# Patient Record
Sex: FEMALE | Race: WHITE | NOT HISPANIC OR LATINO | URBAN - METROPOLITAN AREA
[De-identification: names, ages, dates, MRNs, and addresses within clinical notes are randomized per-mention and may not be internally consistent; named-entity substitution may affect disease eponyms.]

---

## 2017-02-26 ENCOUNTER — MYC MEDICAL ADVICE (OUTPATIENT)
Dept: FAMILY MEDICINE | Facility: CLINIC | Age: 22
End: 2017-02-26

## 2017-02-26 DIAGNOSIS — E28.2 PCOS (POLYCYSTIC OVARIAN SYNDROME): ICD-10-CM

## 2017-02-27 RX ORDER — NORGESTIMATE AND ETHINYL ESTRADIOL 0.25-0.035
1 KIT ORAL DAILY
Qty: 84 TABLET | Refills: 4 | Status: SHIPPED | OUTPATIENT
Start: 2017-02-27 | End: 2018-03-12

## 2017-06-10 ENCOUNTER — HEALTH MAINTENANCE LETTER (OUTPATIENT)
Age: 22
End: 2017-06-10

## 2018-03-12 ENCOUNTER — OFFICE VISIT (OUTPATIENT)
Dept: FAMILY MEDICINE | Facility: CLINIC | Age: 23
End: 2018-03-12
Payer: COMMERCIAL

## 2018-03-12 VITALS
WEIGHT: 203 LBS | DIASTOLIC BLOOD PRESSURE: 82 MMHG | HEIGHT: 63 IN | OXYGEN SATURATION: 97 % | HEART RATE: 64 BPM | TEMPERATURE: 97.8 F | SYSTOLIC BLOOD PRESSURE: 122 MMHG | RESPIRATION RATE: 18 BRPM | BODY MASS INDEX: 35.97 KG/M2

## 2018-03-12 DIAGNOSIS — E28.2 PCOS (POLYCYSTIC OVARIAN SYNDROME): ICD-10-CM

## 2018-03-12 DIAGNOSIS — L73.9 FOLLICULITIS: ICD-10-CM

## 2018-03-12 DIAGNOSIS — B35.3 TINEA PEDIS OF BOTH FEET: Primary | ICD-10-CM

## 2018-03-12 DIAGNOSIS — R61 GENERALIZED HYPERHIDROSIS: ICD-10-CM

## 2018-03-12 RX ORDER — NORGESTIMATE AND ETHINYL ESTRADIOL 0.25-0.035
1 KIT ORAL DAILY
Qty: 84 TABLET | Refills: 4 | Status: SHIPPED | OUTPATIENT
Start: 2018-03-12 | End: 2019-05-13

## 2018-03-12 RX ORDER — PRENATAL VIT 91/IRON/FOLIC/DHA 28-975-200
COMBINATION PACKAGE (EA) ORAL 2 TIMES DAILY
Qty: 30 G | Refills: 1 | Status: SHIPPED | OUTPATIENT
Start: 2018-03-12 | End: 2019-10-09

## 2018-03-12 RX ORDER — MUPIROCIN CALCIUM 20 MG/G
CREAM TOPICAL 2 TIMES DAILY
Qty: 30 G | Refills: 0 | Status: SHIPPED | OUTPATIENT
Start: 2018-03-12 | End: 2019-10-09

## 2018-03-12 ASSESSMENT — ENCOUNTER SYMPTOMS
FATIGUE: 0
ACTIVITY CHANGE: 0
GASTROINTESTINAL NEGATIVE: 1
RESPIRATORY NEGATIVE: 1
DIAPHORESIS: 1
CARDIOVASCULAR NEGATIVE: 1
APPETITE CHANGE: 0
FEVER: 0
PSYCHIATRIC NEGATIVE: 1

## 2018-03-12 NOTE — PATIENT INSTRUCTIONS
Here is the plan from today's visit    1. PCOS (polycystic ovarian syndrome)  - norgestimate-ethinyl estradiol (SPRINTEC 28) 0.25-35 MG-MCG per tablet; Take 1 tablet by mouth daily  Dispense: 84 tablet; Refill: 4    2. Tinea pedis of both feet  - terbinafine (LAMISIL AT) 1 % cream; Apply topically 2 times daily Until resolved (generally 1-2 weeks)  Dispense: 30 g; Refill: 1    3. Generalized hyperhidrosis  Trial of lamisil first to treat the athlete's foot and then the drysol topically to feet to help with excessive sweating  Return if not improving  - aluminum chloride (DRYSOL) 20 % external solution; Apply topically At Bedtime To improve effect, cover area of application with plastic wrap,  hold in place with tight shirt, and wash area in morning. As sweating improves, decrease use to 1-2 times weekly.  Dispense: 60 mL; Refill: 1    4. Folliculitis  - mupirocin (BACTROBAN) 2 % cream; Apply topically 2 times daily  Dispense: 30 g; Refill: 0      Please call or return to clinic if your symptoms don't go away.    Follow up plan - as needed    Thank you for coming to [facility] today.  Lab Testing:  **If you had lab testing today and your results are reassuring or normal they will be mailed to you or sent through [employer] within 7 days.   **If the lab tests need quick action we will call you with the results.  The phone number we will call with results is # [phone] (home) . If this is not the best number please call our clinic and change the number.  Medication Refills:  If you need any refills please call your pharmacy and they will contact us.   If you need to  your refill at a new pharmacy, please contact the new pharmacy directly. The new pharmacy will help you get your medications transferred faster.   Scheduling:  If you have any concerns about today's visit or wish to schedule another appointment please call our office during normal business hours [phone] (8-5:00 M-F)  If a referral was  made to a [facility] and you don't get a call from central scheduling please call [phone].  If a Mammogram was ordered for you at [facility] call [phone] to schedule or change your appointment.  If you had an XRay/CT/Ultrasound/MRI ordered the number is [phone] to schedule or change your radiology appointment.   Medical Concerns:  If you have urgent medical concerns please call [phone] at any time of the day.  If you have a medical emergency please call [phone].

## 2018-03-12 NOTE — PROGRESS NOTES
"      HPI:       Tami Diaz is a 23 year old who presents for the following  Patient presents with:  feet: feet started out dried in November. now, feet is starting to develop odor         Concern: feet    Description of the problem :  patient noticed dryness of both feet in November, some scaling and thickening. The dryness healed and now feet is starting to develop odor and seem damp all the time. Patient has history of sweatiness and condition has worsened.  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  Patient is an established patient of this clinic.         Review of Systems:   Review of Systems   Constitutional: Positive for diaphoresis (see HPI - mainly on feet). Negative for activity change, appetite change, fatigue and fever.   Respiratory: Negative.    Cardiovascular: Negative.    Gastrointestinal: Negative.    Skin: Positive for rash (see HPI).   Psychiatric/Behavioral: Negative.              Physical Exam:   Patient Vitals for the past 24 hrs:   Height Weight   03/12/18 1510 5' 3\" (160 cm) 203 lb (92.1 kg)     Body mass index is 35.96 kg/(m^2).  Vitals were reviewed and were normal     Physical Exam   Constitutional: She is oriented to person, place, and time. She appears well-developed and well-nourished.   HENT:   Head: Normocephalic and atraumatic.   Eyes: Conjunctivae and EOM are normal.   Pulmonary/Chest: Effort normal.   Neurological: She is alert " and oriented to person, place, and time.   Skin: Rash noted.   1) Rash on upper L arm; follicular eruption of posterior upper arm; no spreading erythema; no pustules or vessicles  2) Feet with some slight scale along the bottoms/sides of both feet; normal skin in between the toes; notable dampness to skin   Psychiatric: She has a normal mood and affect. Her behavior is normal. Judgment and thought content normal.         Results:       Assessment and Plan     Patient Instructions   Here is the plan from today's visit    1. PCOS (polycystic ovarian syndrome)  - norgestimate-ethinyl estradiol (SPRINTEC 28) 0.25-35 MG-MCG per tablet; Take 1 tablet by mouth daily  Dispense: 84 tablet; Refill: 4    2. Tinea pedis of both feet  - terbinafine (LAMISIL AT) 1 % cream; Apply topically 2 times daily Until resolved (generally 1-2 weeks)  Dispense: 30 g; Refill: 1    3. Hyperhidrosis of feet  Trial of lamisil first to treat the athlete's foot and then the drysol topically to feet to help with excessive sweating  Return if not improving  Change of socks at least once in the middle of the day  Breathable materials (light wool)  Air dry at home, sleep without socks  - aluminum chloride (DRYSOL) 20 % external solution; Apply topically At Bedtime To improve effect, cover area of application with plastic wrap,  hold in place with tight shirt, and wash area in morning. As sweating improves, decrease use to 1-2 times weekly.  Dispense: 60 mL; Refill: 1    4. Folliculitis  - mupirocin (BACTROBAN) 2 % cream; Apply topically 2 times daily  Dispense: 30 g; Refill: 0      Please call or return to clinic if your symptoms don't go away.    Follow up plan - as needed      There are no discontinued medications.  Options for treatment and follow-up care were reviewed with the patient. Tami Diaz  engaged in the decision making process and verbalized understanding of the options discussed and agreed with the final plan.    Layne Ramos,  MD

## 2018-03-12 NOTE — MR AVS SNAPSHOT
After Visit Summary   3/12/2018    Tami Diaz    MRN: [id number]           Patient Information     Date Of Birth          1995        Visit Information        Provider Department      3/12/2018 3:00 PM Layne Ramos MD [facility]        Today's Diagnoses     Tinea pedis of both feet    -  1    PCOS (polycystic ovarian syndrome)        Generalized hyperhidrosis        Folliculitis          Care Instructions    Here is the plan from today's visit    1. PCOS (polycystic ovarian syndrome)  - norgestimate-ethinyl estradiol (SPRINTEC 28) 0.25-35 MG-MCG per tablet; Take 1 tablet by mouth daily  Dispense: 84 tablet; Refill: 4    2. Tinea pedis of both feet  - terbinafine (LAMISIL AT) 1 % cream; Apply topically 2 times daily Until resolved (generally 1-2 weeks)  Dispense: 30 g; Refill: 1    3. Generalized hyperhidrosis  Trial of lamisil first to treat the athlete's foot and then the drysol topically to feet to help with excessive sweating  Return if not improving  - aluminum chloride (DRYSOL) 20 % external solution; Apply topically At Bedtime To improve effect, cover area of application with plastic wrap,  hold in place with tight shirt, and wash area in morning. As sweating improves, decrease use to 1-2 times weekly.  Dispense: 60 mL; Refill: 1    4. Folliculitis  - mupirocin (BACTROBAN) 2 % cream; Apply topically 2 times daily  Dispense: 30 g; Refill: 0      Please call or return to clinic if your symptoms don't go away.    Follow up plan - as needed    Thank you for coming to [facility] today.  Lab Testing:  **If you had lab testing today and your results are reassuring or normal they will be mailed to you or sent through [employer] within 7 days.   **If the lab tests need quick action we will call you with the results.  The phone number we will call with results is # [phone] (home) . If this is not the best number please call our clinic and change the  number.  Medication Refills:  If you need any refills please call your pharmacy and they will contact us.   If you need to  your refill at a new pharmacy, please contact the new pharmacy directly. The new pharmacy will help you get your medications transferred faster.   Scheduling:  If you have any concerns about today's visit or wish to schedule another appointment please call our office during normal business hours [phone] (8-5:00 M-F)  If a referral was made to a [facility] and you don't get a call from central scheduling please call [phone].  If a Mammogram was ordered for you at [facility] call [phone] to schedule or change your appointment.  If you had an XRay/CT/Ultrasound/MRI ordered the number is [phone] to schedule or change your radiology appointment.   Medical Concerns:  If you have urgent medical concerns please call [phone] at any time of the day.  If you have a medical emergency please call [phone].            Follow-ups after your visit        Who to contact     Please call your clinic at [phone] to:    Ask questions about your health    Make or cancel appointments    Discuss your medicines    Learn about your test results    Speak to your doctor            Additional Information About Your Visit        [employer] Information     [employer] gives you secure access to your electronic health record. If you see a primary care provider, you can also send messages to your care team and make appointments. If you have questions, please call your primary care clinic.  If you do not have a primary care provider, please call [phone] and they will assist you.      [employer] is an electronic gateway that provides easy, online access to your medical records. With [employer], you can request a clinic appointment, read your test results, renew a prescription or communicate with your care team.     To access your existing account, please contact your  "[facility] or call [phone] for assistance.        Care EveryWhere ID     This is your Care EveryWhere ID. This could be used by other organizations to access your [facility] medical records  [id number]        Your Vitals Were     Pulse Temperature Respirations Height Pulse Oximetry BMI (Body Mass Index)    64 97.8  F (36.6  C) (Oral) 18 5' 3\" (160 cm) 97% 35.96 kg/m2       Blood Pressure from Last 3 Encounters:   03/12/18 122/82   07/20/16 129/56   06/22/16 121/84    Weight from Last 3 Encounters:   03/12/18 203 lb (92.1 kg)   06/22/16 213 lb (96.6 kg)   06/09/16 213 lb (96.6 kg)              Today, you had the following     No orders found for display         Today's Medication Changes          These changes are accurate as of 3/12/18  4:22 PM.  If you have any questions, ask your nurse or doctor.               Start taking these medicines.        Dose/Directions    aluminum chloride 20 % external solution   Commonly known as:  DRYSOL   Used for:  Generalized hyperhidrosis   Started by:  Layne Ramos MD        Apply topically At Bedtime To improve effect, cover area of application with plastic wrap,  hold in place with tight shirt, and wash area in morning. As sweating improves, decrease use to 1-2 times weekly.   Quantity:  60 mL   Refills:  1       mupirocin 2 % cream   Commonly known as:  BACTROBAN   Used for:  Folliculitis   Started by:  Layne Ramos MD        Apply topically 2 times daily   Quantity:  30 g   Refills:  0       terbinafine 1 % cream   Commonly known as:  lamISIL AT   Used for:  Tinea pedis of both feet   Started by:  Layne Ramos MD        Apply topically 2 times daily Until resolved (generally 1-2 weeks)   Quantity:  30 g   Refills:  1            Where to get your medicines      These medications were sent to [facility] - [place], [place] - [address] [facility] [address]     Phone:  [phone]     " norgestimate-ethinyl estradiol 0.25-35 MG-MCG per tablet         These medications were sent to [facility] [id number] [place] [address] - [facility] [address] [place] [facility] [address]     Phone:  [phone]     mupirocin 2 % cream    terbinafine 1 % cream         Some of these will need a paper prescription and others can be bought over the counter.  Ask your nurse if you have questions.     Bring a paper prescription for each of these medications     aluminum chloride 20 % external solution                Primary Care Provider Office Phone # Fax #    Shilpa Rey -[phone]       [address] [address]        Equal Access to Services     PHILIPPE DUMONT : Rohith Harper, wago morton, qaybta kaalmada russell, marítnez montemayor. So [facility] [phone].    ATENCIÓN: Si habla español, tiene a reilly disposición servicios gratuitos de asistencia lingüística. [facility] [phone].    We comply with applicable federal civil rights laws and [place] laws. We do not discriminate on the basis of race, color, national origin, age, disability, sex, sexual orientation, or gender identity.            Thank you!     Thank you for choosing [facility]  for your care. Our goal is always to provide you with excellent care. Hearing back from our patients is one way we can continue to improve our services. Please take a few minutes to complete the written survey that you may receive in the mail after your visit with us. Thank you!             Your Updated Medication List - Protect others around you: Learn how to safely use, store and throw away your medicines at www.disposemymeds.org.          This list is accurate as of 3/12/18  4:22 PM.  Always use your most recent med list.                   Brand Name Dispense Instructions for use Diagnosis    aluminum chloride 20 % external solution    DRYSOL    60 mL     Apply topically At Bedtime To improve effect, cover area of application with plastic wrap,  hold in place with tight shirt, and wash area in morning. As sweating improves, decrease use to 1-2 times weekly.    Generalized hyperhidrosis       mupirocin 2 % cream    BACTROBAN    30 g    Apply topically 2 times daily    Folliculitis       norgestimate-ethinyl estradiol 0.25-35 MG-MCG per tablet    SPRINTEC 28    84 tablet    Take 1 tablet by mouth daily    PCOS (polycystic ovarian syndrome)       tacrolimus 0.1 % ointment    PROTOPIC    30 g    After 2 weeks, may use this product twice daily as needed.    Dermatitis       terbinafine 1 % cream    lamISIL AT    30 g    Apply topically 2 times daily Until resolved (generally 1-2 weeks)    Tinea pedis of both feet

## 2019-01-10 ENCOUNTER — OFFICE VISIT (OUTPATIENT)
Dept: FAMILY MEDICINE | Facility: CLINIC | Age: 24
End: 2019-01-10
Payer: COMMERCIAL

## 2019-01-10 VITALS
DIASTOLIC BLOOD PRESSURE: 58 MMHG | SYSTOLIC BLOOD PRESSURE: 113 MMHG | OXYGEN SATURATION: 96 % | TEMPERATURE: 97.4 F | HEART RATE: 84 BPM | WEIGHT: 220 LBS | BODY MASS INDEX: 38.97 KG/M2

## 2019-01-10 DIAGNOSIS — Z84.1 FAMILY HISTORY OF PRIMARY IGA NEPHROPATHY: ICD-10-CM

## 2019-01-10 DIAGNOSIS — N93.8 DUB (DYSFUNCTIONAL UTERINE BLEEDING): ICD-10-CM

## 2019-01-10 DIAGNOSIS — E55.9 VITAMIN D DEFICIENCY: ICD-10-CM

## 2019-01-10 DIAGNOSIS — Z30.8 ENCOUNTER FOR OTHER CONTRACEPTIVE MANAGEMENT: ICD-10-CM

## 2019-01-10 DIAGNOSIS — Z00.00 ROUTINE GENERAL MEDICAL EXAMINATION AT A HEALTH CARE FACILITY: Primary | ICD-10-CM

## 2019-01-10 DIAGNOSIS — D68.00 VON WILLEBRAND'S DISEASE (H): ICD-10-CM

## 2019-01-10 LAB
BASOPHILS # BLD AUTO: 0 10E9/L (ref 0–0.2)
BASOPHILS NFR BLD AUTO: 0.2 %
BILIRUBIN UR: ABNORMAL
BLOOD UR: NEGATIVE
BUN SERPL-MCNC: 10.2 MG/DL (ref 7–19)
CALCIUM SERPL-MCNC: 9.7 MG/DL (ref 8.5–10.1)
CHLORIDE SERPLBLD-SCNC: 100.7 MMOL/L (ref 98–110)
CO2 SERPL-SCNC: 24.8 MMOL/L (ref 20–32)
CREAT SERPL-MCNC: 0.6 MG/DL (ref 0.5–1)
DEPRECATED CALCIDIOL+CALCIFEROL SERPL-MC: 17 UG/L (ref 20–75)
DIFFERENTIAL METHOD BLD: ABNORMAL
EOSINOPHIL # BLD AUTO: 0.1 10E9/L (ref 0–0.7)
EOSINOPHIL NFR BLD AUTO: 1.1 %
ERYTHROCYTE [DISTWIDTH] IN BLOOD BY AUTOMATED COUNT: 13.6 % (ref 10–15)
FERRITIN SERPL-MCNC: 57 NG/ML (ref 12–150)
GFR SERPL CREATININE-BSD FRML MDRD: >90 ML/MIN/1.7 M2
GLUCOSE SERPL-MCNC: 135.5 MG'DL (ref 70–99)
GLUCOSE URINE: NEGATIVE
HCT VFR BLD AUTO: 46 % (ref 35–47)
HGB BLD-MCNC: 14.7 G/DL (ref 11.7–15.7)
IMM GRANULOCYTES # BLD: 0 10E9/L (ref 0–0.4)
IMM GRANULOCYTES NFR BLD: 0.2 %
KETONES UR QL: ABNORMAL
LEUKOCYTE ESTERASE UR: NEGATIVE
LYMPHOCYTES # BLD AUTO: 2.5 10E9/L (ref 0.8–5.3)
LYMPHOCYTES NFR BLD AUTO: 30.4 %
MCH RBC QN AUTO: 27.2 PG (ref 26.5–33)
MCHC RBC AUTO-ENTMCNC: 32 G/DL (ref 31.5–36.5)
MCV RBC AUTO: 85 FL (ref 78–100)
MONOCYTES # BLD AUTO: 0.5 10E9/L (ref 0–1.3)
MONOCYTES NFR BLD AUTO: 6.1 %
NEUTROPHILS # BLD AUTO: 5 10E9/L (ref 1.6–8.3)
NEUTROPHILS NFR BLD AUTO: 62 %
NITRITE UR QL STRIP: NEGATIVE
NRBC # BLD AUTO: 0 10*3/UL
NRBC BLD AUTO-RTO: 0 /100
PH UR STRIP: 6 [PH] (ref 5–7)
PLATELET # BLD AUTO: 291 10E9/L (ref 150–450)
POTASSIUM SERPL-SCNC: 4 MMOL/DL (ref 3.3–4.5)
PROTEIN UR: NEGATIVE
RBC # BLD AUTO: 5.41 10E12/L (ref 3.8–5.2)
SODIUM SERPL-SCNC: 134.5 MMOL/L (ref 132.6–141.4)
SP GR UR STRIP: 1.02
UROBILINOGEN UR STRIP-ACNC: ABNORMAL
WBC # BLD AUTO: 8.1 10E9/L (ref 4–11)

## 2019-01-10 NOTE — PROGRESS NOTES
Female Physical Note          HPI   Concerns today: ferratin, hemoglobin r/t bleeding disorder, and vitamin D check    Preventative  Feeling great with high energy. No evidence of bleeding, but endorses epistaxis, which is normal for them. Has never had a pap. Received flu shot at [facility] yesterday. Regularly visits the dentist.    Will eat fruits, vegetables. Will tries to incoporate walking, taking the stairs at school.    Menstrual Periods  Reports periods have been more irregular. Has been on the pill for 7 years, started consistently spotting before starting placebo. Will do a new placebo week every month. Thinks it's normal breakthrough bleeding. Wonders if they need to adjust med dose. Is not using birth control to avoid pregnancy, solely to control bleeding. Thinks the ring is difficult, would not like to gain weight. Pt opted for Mirena. Endorses pain and cramping.    Last saw Dr. Hightower for hematology in 2016. Plan from heme: test for von willebrand disease and platelet dysfunction, which were normal but may not be normal off estrogen. So if ever off estrogen could consider retesting.     Mood  Mood has been great. Is happy. Has good friends at school. Employed at [employer]. Identifies as female uses she/her/hers pronouns.    Sexual Health  Reports her libido is high. Has not yet explored her body/orgasms, would like to but is nervous. Is interested in having a relationship, but hasn't found anyone yet. Identifies as a lesbian.    Social Hx  Graduating next month, Industrial Design. No tobacco, drug use. Has a poodle.    Patient Active Problem List   Diagnosis     PCOS (polycystic ovarian syndrome)     Von Willebrand's disease (H)     Pervasive developmental disorder     Generalized hypermobility of joints     Seasonal allergic rhinitis     Pes planus of both feet     Chronic hip pain, unspecified laterality     Cervical segment dysfunction     Cervicalgia     Nonallopathic lesion of thoracic region      Chronic bilateral thoracic back pain     Nonallopathic lesion of sacrococcygeal region     Chronic bilateral low back pain without sciatica     Muscle spasm       Past Medical History:   Diagnosis Date     Dyslexia      Dysphasia      Iron deficiency anemia     not responsive to oral iron, venofer infusinos     PCOS (polycystic ovarian syndrome)      Receptive expressive language disorder        Previous Medical Care      Family History   Problem Relation Age of Onset     Hypertension Mother         birth mom      Kidney Disease Mother         IgA nephropathy     Ovarian Cancer Maternal Aunt 61        widely metastatic at dx     No Known Problems Brother      No Known Problems Brother      Coronary Artery Disease No family hx of      Diabetes No family hx of      Hyperlipidemia No family hx of      Cerebrovascular Disease No family hx of             Review of Systems:     Review of Systems:  CONSTITUTIONAL: NEGATIVE for fever, chills, change in weight  INTEGUMENTARY/SKIN: NEGATIVE for worrisome rashes, moles or lesions  EYES: NEGATIVE for vision changes or irritation  ENT/MOUTH: NEGATIVE for ear, mouth and throat problems  RESP: NEGATIVE for significant cough or SOB  BREAST: NEGATIVE for masses, tenderness or discharge  CV: NEGATIVE for chest pain, palpitations or peripheral edema  GI: NEGATIVE for nausea, abdominal pain, heartburn, or change in bowel habits  : NEGATIVE for frequency, dysuria, or hematuria  MUSCULOSKELETAL: NEGATIVE for significant arthralgias or myalgia  NEURO: NEGATIVE for weakness, dizziness or paresthesias  ENDOCRINE: NEGATIVE for temperature intolerance, skin/hair changes  HEME/ALLERGY: NEGATIVE for bleeding problems  PSYCHIATRIC: NEGATIVE for changes in mood or affect  Sleep:   Do you snore most or the night (as reported by a family member)? No  Do you feel sleepy or extremely tired during most of the day? No    See HPI for additional sx.    This document serves as a record of the  services and decisions personally performed and made by Shilpa Rey MD. It was created on his/her behalf by Minoo Ribeiro, a trained medical scribe. The creation of this document is based the provider's statements to the medical scribe.  Rajat Ribeiro 9:37 AM, January 10, 2019       Social History     Social History     Socioeconomic History     Marital status: Single     Spouse name: Not on file     Number of children: Not on file     Years of education: Not on file     Highest education level: Not on file   Social Needs     Financial resource strain: Not on file     Food insecurity - worry: Not on file     Food insecurity - inability: Not on file     Transportation needs - medical: Not on file     Transportation needs - non-medical: Not on file   Occupational History     Not on file   Tobacco Use     Smoking status: Never Smoker     Smokeless tobacco: Never Used   Substance and Sexual Activity     Alcohol use: No     Drug use: No     Sexual activity: No     Birth control/protection: Pill     Comment: OCP for PCOS   Other Topics Concern      Service Not Asked     Blood Transfusions Not Asked     Caffeine Concern Not Asked     Occupational Exposure Not Asked     Hobby Hazards Not Asked     Sleep Concern Not Asked     Stress Concern Not Asked     Weight Concern Not Asked     Special Diet Not Asked     Back Care Not Asked     Exercise No     Bike Helmet Yes     Seat Belt Yes     Self-Exams Not Asked     Parent/sibling w/ CABG, MI or angioplasty before 65F 55M? 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 Works at [employer]     Swimmer    Biomom Halie Davidsonock    Other mom Iqra Diaz    Sperm donor is a friend-known family hx, Halie Garrett       Marital Status:Single  Who lives in your household? roommates off-campus in housing, and with two moms when home on break.    Has anyone hurt you physically, for example by  pushing, hitting, slapping or kicking you or forcing you to have sex? Denies  Do you feel threatened or controlled by a partner, ex-partner or anyone in your life? Denies    Sexual Health     Sexual concerns: No   STI History: Neg  Pregnancy History: No obstetric history on file.  LMP No LMP recorded.   Last Pap Smear Date: No results found for: PAP  Abnormal Pap History: None and See problem list    Recommended Screening     Pap every 3 years for women 21-29. Recommended and patient accepted testing.         Physical Exam:   Vitals: /58   Pulse 84   Temp 97.4  F (36.3  C)   Wt 99.8 kg (220 lb)   SpO2 96%   BMI 38.97 kg/m    BMI= Body mass index is 38.97 kg/m .   Vitals were reviewed and were normal.    Physical Exam:    GENERAL: healthy, alert and no distress  EYES: Eyes grossly normal to inspection, extraocular movements - intact, and PERRL  HENT: ear canals- normal; TMs- normal; Nose- normal; Mouth- no ulcers, no lesions  NECK: no tenderness, no adenopathy, no asymmetry, no masses, no stiffness; thyroid- normal to palpation  RESP: lungs clear to auscultation - no rales, no rhonchi, no wheezes  CV: regular rates and rhythm, normal S1 S2, no S3 or S4 and no murmur, no click or rub -  ABDOMEN: soft, no tenderness, no  hepatosplenomegaly, no masses, normal bowel sounds  MS: extremities- no gross deformities noted, no edema  SKIN: no suspicious lesions, no rashes  NEURO: strength and tone- normal, sensory exam- grossly normal, mentation- intact, speech- normal, reflexes- symmetric  BACK: no CVA tenderness, no paralumbar tenderness  PSYCH: Alert and oriented times 3; speech- coherent , normal rate and volume; able to articulate logical thoughts, able to abstract reason, affect- bright, euthymic.  LYMPHATICS: ant. cervical- normal, post. cervical- normal, axillary- normal, supraclavicular- normal, inguinal- normal  Assessment and Plan   Tami was seen today for physical.    Diagnoses and all orders for this  visit:    Routine general medical examination at a health care facility  -     Pap imaged thin layer screen only - recommended age 21 - 24 years  -     Vitamin D Level  - Education on possible alternate methods of contraception. Pt chose Mirena, will schedule separate visit for insertion.  - Pap and Mirena insertion will be same visit.  Von Willebrand's disease (H) vs plt dysfunction   -     Ferritin  -     CBC with platelets differential  Needs retesting off estrogen (see ntoe dr. gardner 2015)    DUB (dysfunctional uterine bleeding)  - Future Mirena insertion.  - Labs in 3-6 months at [facility].  - Possible heme referral, future.    BMI 39.0-39.9,adult adivsed re: avoiding getting bmi >40. Wt loss indicated however pt wants to focus on last semester of school. rec follow up   Weight  1. Goal: lower your current weight, OR maintain this current weight. Going higher, is going to increase your risk.  2. Be more active.  3. Being fit is more important than being fat.    Encounter for other contraceptive management  -     Colposcopy/Gynecology Clinic-[facility] INTERNAL REFERRAL    Family history of primary IgA nephropathy  -     Basic Metabolic Panel ([facility])  -     Urinalysis (UA) ([facility])    AVS  Preventative  1. Good job on getting your flu shot!  2. Labs today at [facility]. Results via [facility].    Sexual Health  1. Use lubrication for penetrative activities. Coconut oil is safe to use as a lubricant.  2. Explore yourself, and what you enjoy.    Hematology  1. Continue birth control pills until you have the Mirena inserted.   2. After 3to 6 months with IUD insertion, and no birth control pills we will re do labs. You may possibly follow up with hematology.  3. Take 600mg of ibuprofen OR 1000mg Tylenol before you come in for the Mirena insertion, to help with cramping. The best medication for uterine cramping is Ibuprofen.      Options for treatment and follow-up care were reviewed with the patient . Tami Diaz  and/or guardian engaged in the decision making process and verbalized understanding of the options discussed and agreed with the final plan.    The information in this document, created by the medical scribe for me, accurately reflects the services I personally performed and the decisions made by me. I have reviewed and approved this document for accuracy prior to leaving the patient care area.    Shilpa Rye MD  9:37 AM, 01/10/19

## 2019-01-10 NOTE — PATIENT INSTRUCTIONS
Preventative  1. Good job on getting your flu shot!  2. Labs today at \[facility]\"". Results via [facility].    Weight  1. Goal: lower your current weight, OR maintain this current weight. Going higher, is going to increase your risk.  2. Be more active.  3. Being fit is more important than being fat.    Sexual Health  1. Use lubrication for penetrative activities. Coconut oil is safe to use as a lubricant.  2. Explore yourself, and what you enjoy.    Hematology  1. Continue birth control pills until you have the Mirena inserted.   2. After 3to 6 months with IUD insertion, and no birth control pills we will re do labs. You will then follow up with hematology.  3. Take 600mg of ibuprofen OR 1000mg Tylenol before you come in for the Mirena insertion, to help with cramping. The best medication for uterine cramping is Ibuprofen.      Preventive Health Recommendations  Female Ages 21 to 25     Yearly exam:     See your health care provider every year in order to  o Review health changes.   o Discuss preventive care.    o Review your medicines if your doctor has prescribed any.      You should be tested each year for STDs (sexually transmitted diseases).       Talk to your provider about how often you should have cholesterol testing.      Get a Pap test every three years. If you have an abnormal result, your doctor may have you test more often.      If you are at risk for diabetes, you should have a diabetes test (fasting glucose).     Shots:     Get a flu shot each year.     Get a tetanus shot every 10 years.     Consider getting the shot (vaccine) that prevents cervical cancer (Gardasil).    Nutrition:     Eat at least 5 servings of fruits and vegetables each day.    Eat whole-grain bread, whole-wheat pasta and brown rice instead of white grains and rice.    Get adequate Calcium and Vitamin D.     Lifestyle    Exercise at least 150 minutes a week each week (30 minutes a day, 5 days a week). This will help you control your  weight and prevent disease.    Limit alcohol to one drink per day.    No smoking.     Wear sunscreen to prevent skin cancer.    See your dentist every six months for an exam and cleaning.

## 2019-01-11 RX ORDER — ERGOCALCIFEROL 1.25 MG/1
50000 CAPSULE, LIQUID FILLED ORAL WEEKLY
Qty: 8 CAPSULE | Refills: 0 | Status: SHIPPED | OUTPATIENT
Start: 2019-01-11 | End: 2019-03-02

## 2019-01-30 ENCOUNTER — TELEPHONE (OUTPATIENT)
Dept: FAMILY MEDICINE | Facility: CLINIC | Age: 24
End: 2019-01-30

## 2019-01-30 NOTE — TELEPHONE ENCOUNTER
Called patient to schedule IUD placement and pap. Patient advised goes to school in [place]; not available to come in for appointment until spring break in March. Offered to schedule appointment for March. Patient declined; will call back to schedule.    What procedure: mirena IUD and simultaneous pap smear. Pt is a nullip.   Urgency of Appointment: Next Available  Would this patient benefit from pre-medication with Ativan for procedural anxiety? No  Is this patient on testosterone or post-menopausal (vaginal estrogen recommended)? No

## 2019-05-13 DIAGNOSIS — E28.2 PCOS (POLYCYSTIC OVARIAN SYNDROME): ICD-10-CM

## 2019-05-13 RX ORDER — NORGESTIMATE AND ETHINYL ESTRADIOL 0.25-0.035
1 KIT ORAL DAILY
Qty: 84 TABLET | Refills: 4 | Status: SHIPPED | OUTPATIENT
Start: 2019-05-13 | End: 2019-10-09

## 2019-09-30 ENCOUNTER — HEALTH MAINTENANCE LETTER (OUTPATIENT)
Age: 24
End: 2019-09-30

## 2019-10-09 ENCOUNTER — OFFICE VISIT (OUTPATIENT)
Dept: OBGYN | Facility: CLINIC | Age: 24
End: 2019-10-09
Attending: OBSTETRICS & GYNECOLOGY
Payer: COMMERCIAL

## 2019-10-09 ENCOUNTER — ANCILLARY PROCEDURE (OUTPATIENT)
Dept: ULTRASOUND IMAGING | Facility: CLINIC | Age: 24
End: 2019-10-09
Attending: OBSTETRICS & GYNECOLOGY
Payer: COMMERCIAL

## 2019-10-09 VITALS
WEIGHT: 210.3 LBS | HEIGHT: 63 IN | DIASTOLIC BLOOD PRESSURE: 107 MMHG | HEART RATE: 78 BPM | SYSTOLIC BLOOD PRESSURE: 145 MMHG | BODY MASS INDEX: 37.26 KG/M2

## 2019-10-09 DIAGNOSIS — E28.2 PCOS (POLYCYSTIC OVARIAN SYNDROME): Primary | ICD-10-CM

## 2019-10-09 DIAGNOSIS — E28.2 PCOS (POLYCYSTIC OVARIAN SYNDROME): ICD-10-CM

## 2019-10-09 PROCEDURE — 76830 TRANSVAGINAL US NON-OB: CPT

## 2019-10-09 RX ORDER — ESTRADIOL 1 MG/1
TABLET ORAL
Qty: 90 TABLET | Refills: 3 | Status: SHIPPED | OUTPATIENT
Start: 2019-10-09 | End: 2020-06-13

## 2019-10-09 RX ORDER — DROSPIRENONE AND ETHINYL ESTRADIOL 0.03MG-3MG
KIT ORAL
Qty: 90 TABLET | Refills: 3 | Status: SHIPPED | OUTPATIENT
Start: 2019-10-09 | End: 2020-06-13

## 2019-10-09 ASSESSMENT — ANXIETY QUESTIONNAIRES
6. BECOMING EASILY ANNOYED OR IRRITABLE: NOT AT ALL
7. FEELING AFRAID AS IF SOMETHING AWFUL MIGHT HAPPEN: NOT AT ALL
GAD7 TOTAL SCORE: 0
5. BEING SO RESTLESS THAT IT IS HARD TO SIT STILL: NOT AT ALL
2. NOT BEING ABLE TO STOP OR CONTROL WORRYING: NOT AT ALL
1. FEELING NERVOUS, ANXIOUS, OR ON EDGE: NOT AT ALL
3. WORRYING TOO MUCH ABOUT DIFFERENT THINGS: NOT AT ALL

## 2019-10-09 ASSESSMENT — MIFFLIN-ST. JEOR: SCORE: 1678.04

## 2019-10-09 ASSESSMENT — PATIENT HEALTH QUESTIONNAIRE - PHQ9
5. POOR APPETITE OR OVEREATING: NOT AT ALL
SUM OF ALL RESPONSES TO PHQ QUESTIONS 1-9: 1

## 2019-10-09 NOTE — PROGRESS NOTES
23 yo P0 LMP irregular on OCP presents for persistent breakthrough bleeding in the setting of vonWillebrand's disease, longterm OCP use, and PCOS.     Discussed options, risks, and benefits of different methods for menstrual control.     Patient Active Problem List   Diagnosis     PCOS (polycystic ovarian syndrome)     Von Willebrand's disease (H)     Pervasive developmental disorder     Generalized hypermobility of joints     Seasonal allergic rhinitis     Pes planus of both feet     Chronic hip pain, unspecified laterality     Cervical segment dysfunction     Cervicalgia     Nonallopathic lesion of thoracic region     Chronic bilateral thoracic back pain     Nonallopathic lesion of sacrococcygeal region     Chronic bilateral low back pain without sciatica     Muscle spasm     Past Medical History:   Diagnosis Date     Dyslexia      Dysphasia      Iron deficiency anemia     not responsive to oral iron, venofer infusinos     PCOS (polycystic ovarian syndrome)      Receptive expressive language disorder      Past Surgical History:   Procedure Laterality Date     HC CONTROL NOSEBLEED ANTERIOR, SIMPLE      multiple, due to VWD     OB History    Para Term  AB Living   0 0 0 0 0 0   SAB TAB Ectopic Multiple Live Births   0 0 0 0 0     Social History     Socioeconomic History     Marital status: Single     Spouse name: None     Number of children: None     Years of education: None     Highest education level: None   Occupational History     Occupation: [employer]      Comment: just finished degree in industrial design   Social Needs     Financial resource strain: None     Food insecurity:     Worry: None     Inability: None     Transportation needs:     Medical: None     Non-medical: None   Tobacco Use     Smoking status: Never Smoker     Smokeless tobacco: Never Used   Substance and Sexual Activity     Alcohol use: No     Drug use: No     Sexual activity: Never     Partners: Female     Birth  "control/protection: Pill     Comment: OCP for PCOS   Lifestyle     Physical activity:     Days per week: None     Minutes per session: None     Stress: None   Relationships     Social connections:     Talks on phone: None     Gets together: None     Attends Christian service: None     Active member of club or organization: None     Attends meetings of clubs or organizations: None     Relationship status: None     Intimate partner violence:     Fear of current or ex partner: None     Emotionally abused: None     Physically abused: None     Forced sexual activity: None   Other Topics Concern      Service Not Asked     Blood Transfusions Not Asked     Caffeine Concern Not Asked     Occupational Exposure Not Asked     Hobby Hazards Not Asked     Sleep Concern Not Asked     Stress Concern Not Asked     Weight Concern Not Asked     Special Diet Not Asked     Back Care Not Asked     Exercise No     Bike Helmet Yes     Seat Belt Yes     Self-Exams Not Asked     Parent/sibling w/ CABG, MI or angioplasty before 65F 55M? Not Asked   Social History Narrative    10/9/19    Finished college, back at home with parents. Degree in industrial design. Looking for \"real job\"    Dating.     Shilpa Godoy MD                Lives at college, [employer] [facility] at [employer] [employer], major is industrial design. Works at [employer].     Mojostreetom Halie Warroad    Other mom Iqra Diaz    Sperm donor is a friend-known family hx, Halie carried Tami     BP (!) 145/107   Pulse 78   Ht 1.6 m (5' 3\")   Wt 95.4 kg (210 lb 4.8 oz)   LMP 10/04/2019   Breastfeeding? No   BMI 37.25 kg/m    Appears well  Ultrasound shows thin endometrial stripe     Total visit time was 20 minutes with 20 minutes spent in counseling and coordination of care for breakthrough bleeding.    A/P:  Metrorrhagia on cyclic OCPs  Trial continuous OCP with addback E2 for breakthrough bleeding. Rxs sent.   Labs for metabolic disease associated with PCOS. "     Connect with Shilpa Rey re: possible metformin use    RTC prn or mychart with progress or concerns.     Shilpa Godoy MD

## 2019-10-09 NOTE — LETTER
10/9/2019       RE: Tami Diaz  [address]  [place] [address]     Dear Colleague,    Thank you for referring your patient, Tami Diaz, to the [facility]. Please see a copy of my visit note below.    23 yo P0 LMP irregular on OCP presents for persistent breakthrough bleeding in the setting of vonWillebrand's disease, longterm OCP use, and PCOS.     Discussed options, risks, and benefits of different methods for menstrual control.     Patient Active Problem List   Diagnosis     PCOS (polycystic ovarian syndrome)     Von Willebrand's disease (H)     Pervasive developmental disorder     Generalized hypermobility of joints     Seasonal allergic rhinitis     Pes planus of both feet     Chronic hip pain, unspecified laterality     Cervical segment dysfunction     Cervicalgia     Nonallopathic lesion of thoracic region     Chronic bilateral thoracic back pain     Nonallopathic lesion of sacrococcygeal region     Chronic bilateral low back pain without sciatica     Muscle spasm     Past Medical History:   Diagnosis Date     Dyslexia      Dysphasia      Iron deficiency anemia     not responsive to oral iron, venofer infusinos     PCOS (polycystic ovarian syndrome)      Receptive expressive language disorder      Past Surgical History:   Procedure Laterality Date     HC CONTROL NOSEBLEED ANTERIOR, SIMPLE      multiple, due to VWD     OB History    Para Term  AB Living   0 0 0 0 0 0   SAB TAB Ectopic Multiple Live Births   0 0 0 0 0     Social History     Socioeconomic History     Marital status: Single     Spouse name: None     Number of children: None     Years of education: None     Highest education level: None   Occupational History     Occupation: [employer]      Comment: just finished degree in industrial design   Social Needs     Financial resource strain: None     Food insecurity:     Worry: None      "Inability: None     Transportation needs:     Medical: None     Non-medical: None   Tobacco Use     Smoking status: Never Smoker     Smokeless tobacco: Never Used   Substance and Sexual Activity     Alcohol use: No     Drug use: No     Sexual activity: Never     Partners: Female     Birth control/protection: Pill     Comment: OCP for PCOS   Lifestyle     Physical activity:     Days per week: None     Minutes per session: None     Stress: None   Relationships     Social connections:     Talks on phone: None     Gets together: None     Attends Hoahaoism service: None     Active member of club or organization: None     Attends meetings of clubs or organizations: None     Relationship status: None     Intimate partner violence:     Fear of current or ex partner: None     Emotionally abused: None     Physically abused: None     Forced sexual activity: None   Other Topics Concern      Service Not Asked     Blood Transfusions Not Asked     Caffeine Concern Not Asked     Occupational Exposure Not Asked     Hobby Hazards Not Asked     Sleep Concern Not Asked     Stress Concern Not Asked     Weight Concern Not Asked     Special Diet Not Asked     Back Care Not Asked     Exercise No     Bike Helmet Yes     Seat Belt Yes     Self-Exams Not Asked     Parent/sibling w/ CABG, MI or angioplasty before 65F 55M? Not Asked   Social History Narrative    10/9/19    Finished college, back at home with parents. Degree in industrial design. Looking for \"real job\"    Dating.     Shilpa Godoy MD                Lives at college, [employer] [facility] [employer], major is industrial design. Works at [employer].     Swimmer    Biomom Halie Yahaira    Other mom Iqra Diaz    Sperm donor is a friend-known family hx, Halie carried Tami     BP (!) 145/107   Pulse 78   Ht 1.6 m (5' 3\")   Wt 95.4 kg (210 lb 4.8 oz)   LMP 10/04/2019   Breastfeeding? No   BMI 37.25 kg/m     Appears well  Ultrasound shows thin endometrial stripe "     Total visit time was 20 minutes with 20 minutes spent in counseling and coordination of care for breakthrough bleeding.    A/P:  Metrorrhagia on cyclic OCPs  Trial continuous OCP with addback E2 for breakthrough bleeding. Rxs sent.   Labs for metabolic disease associated with PCOS.     Connect with Shilpa Rey re: possible metformin use    RTC prn or mychart with progress or concerns.     Shilpa Godoy MD

## 2019-10-10 ASSESSMENT — ANXIETY QUESTIONNAIRES: GAD7 TOTAL SCORE: 0

## 2019-10-21 DIAGNOSIS — E28.2 PCOS (POLYCYSTIC OVARIAN SYNDROME): ICD-10-CM

## 2019-10-21 LAB
CHOLEST SERPL-MCNC: 191 MG/DL
GLUCOSE SERPL-MCNC: 88 MG/DL (ref 70–99)
HBA1C MFR BLD: 5.9 % (ref 0–5.6)
HDLC SERPL-MCNC: 49 MG/DL
LDLC SERPL CALC-MCNC: 83 MG/DL
NONHDLC SERPL-MCNC: 142 MG/DL
TRIGL SERPL-MCNC: 294 MG/DL

## 2019-10-21 PROCEDURE — 36415 COLL VENOUS BLD VENIPUNCTURE: CPT | Performed by: OBSTETRICS & GYNECOLOGY

## 2019-10-21 PROCEDURE — 83036 HEMOGLOBIN GLYCOSYLATED A1C: CPT | Performed by: OBSTETRICS & GYNECOLOGY

## 2019-10-21 PROCEDURE — 82947 ASSAY GLUCOSE BLOOD QUANT: CPT | Performed by: OBSTETRICS & GYNECOLOGY

## 2019-10-21 PROCEDURE — 80061 LIPID PANEL: CPT | Performed by: OBSTETRICS & GYNECOLOGY

## 2019-11-08 DIAGNOSIS — E28.2 PCOS (POLYCYSTIC OVARIAN SYNDROME): Primary | ICD-10-CM

## 2019-11-08 RX ORDER — METFORMIN HCL 500 MG
TABLET, EXTENDED RELEASE 24 HR ORAL
Qty: 120 TABLET | Refills: 3 | Status: SHIPPED | OUTPATIENT
Start: 2019-11-08 | End: 2020-02-11

## 2020-02-11 ENCOUNTER — OFFICE VISIT (OUTPATIENT)
Dept: FAMILY MEDICINE | Facility: CLINIC | Age: 25
End: 2020-02-11
Payer: COMMERCIAL

## 2020-02-11 VITALS
WEIGHT: 202.2 LBS | TEMPERATURE: 97.5 F | DIASTOLIC BLOOD PRESSURE: 81 MMHG | RESPIRATION RATE: 16 BRPM | BODY MASS INDEX: 35.83 KG/M2 | OXYGEN SATURATION: 98 % | HEART RATE: 67 BPM | SYSTOLIC BLOOD PRESSURE: 135 MMHG | HEIGHT: 63 IN

## 2020-02-11 DIAGNOSIS — H92.01 RIGHT EAR PAIN: Primary | ICD-10-CM

## 2020-02-11 DIAGNOSIS — M99.00 SOMATIC DYSFUNCTION OF HEAD REGION: ICD-10-CM

## 2020-02-11 DIAGNOSIS — H61.23 BILATERAL IMPACTED CERUMEN: ICD-10-CM

## 2020-02-11 ASSESSMENT — ENCOUNTER SYMPTOMS
FEVER: 0
SORE THROAT: 0
COUGH: 0
CHILLS: 0

## 2020-02-11 ASSESSMENT — MIFFLIN-ST. JEOR: SCORE: 1636.3

## 2020-02-11 NOTE — PROGRESS NOTES
Preceptor Attestation:   Patient seen, evaluated and discussed with the resident. I have verified the content of the note, which accurately reflects my assessment of the patient and the plan of care.   Supervising Physician:  Eugenio Rodriguez MD

## 2020-03-15 ENCOUNTER — HEALTH MAINTENANCE LETTER (OUTPATIENT)
Age: 25
End: 2020-03-15

## 2020-03-30 ENCOUNTER — TELEPHONE (OUTPATIENT)
Dept: FAMILY MEDICINE | Facility: CLINIC | Age: 25
End: 2020-03-30

## 2020-03-30 NOTE — TELEPHONE ENCOUNTER
RN spoke with patient who denies facial tenderness, discolored mucus, headache, or body aches.    They state that they get pretty bad seasonal allergies or mild colds and this is what the current symptoms feel like, aside from the extreme loss of voice. They have been very well hydrated, with water and hot tea.    RN recommended over the counter allergy medication to see if that makes a difference. Also recommended to continue the hydration, throat lozenges, and a humidifier.    They wanted to know if it is okay to go to work, RN stated that based on these current symptoms, yes they should be able to work. However recommended monitoring temperature twice daily and if they were to develop a fever, cough, or shortness of breath, they would need to self isolate.    Routing to PCP as an FYI.  Kelley Henderson RN

## 2020-03-30 NOTE — TELEPHONE ENCOUNTER
Agree with [facility] referral to assess.   Loss of voice has not been associated with COVID so this is reassuring.

## 2020-03-30 NOTE — TELEPHONE ENCOUNTER
[facility] phone call message-patient reporting a symptom:     Symptom: Sore throat causing loss of voice & runny nose.    When did symptoms begin? One week ago    Characteristics: (location on body, intensity, what makes it better or worse, associated symptoms):      Additional Details: Patient stated symptoms began a week ago and the sore throat is not causing pain but is causing her to lose her voice. Patient declined having any fever, cough, or SOB, no travel but unsure of possible exposure d/t working at [employer]. Patient requested a call back from RN to discuss symptoms. Author informed patient about [facility] and patient stated she will visit site while she waits for a call back.       Same Day Visit Offered: Yes, declined    Additional comments:     OK to leave message on voice mail? Yes    Advised patient that RN would call back within 3 hours, unless emergent   Primary language: English      needed? No    Call taken on March 30, 2020 at 10:58 AM by Isabel Rowe

## 2020-06-13 ENCOUNTER — MYC REFILL (OUTPATIENT)
Dept: OBGYN | Facility: CLINIC | Age: 25
End: 2020-06-13

## 2020-06-13 DIAGNOSIS — E28.2 PCOS (POLYCYSTIC OVARIAN SYNDROME): ICD-10-CM

## 2020-06-17 RX ORDER — DROSPIRENONE AND ETHINYL ESTRADIOL 0.03MG-3MG
KIT ORAL
Qty: 112 TABLET | Refills: 1 | Status: SHIPPED | OUTPATIENT
Start: 2020-06-17 | End: 2020-07-21

## 2020-06-17 RX ORDER — ESTRADIOL 1 MG/1
TABLET ORAL
Qty: 90 TABLET | Refills: 1 | Status: SHIPPED | OUTPATIENT
Start: 2020-06-17 | End: 2021-01-05

## 2020-06-17 NOTE — TELEPHONE ENCOUNTER
Sent remaining refills of OCP and estrace tabs to [facility] in [place] as requested.

## 2020-07-21 DIAGNOSIS — E28.2 PCOS (POLYCYSTIC OVARIAN SYNDROME): ICD-10-CM

## 2020-07-21 RX ORDER — DROSPIRENONE AND ETHINYL ESTRADIOL 0.03MG-3MG
KIT ORAL
Qty: 112 TABLET | Refills: 3 | Status: SHIPPED | OUTPATIENT
Start: 2020-07-21 | End: 2020-12-21

## 2020-12-21 DIAGNOSIS — E28.2 PCOS (POLYCYSTIC OVARIAN SYNDROME): ICD-10-CM

## 2020-12-21 RX ORDER — DROSPIRENONE AND ETHINYL ESTRADIOL 0.03MG-3MG
KIT ORAL
Qty: 112 TABLET | Refills: 3 | Status: SHIPPED | OUTPATIENT
Start: 2020-12-21 | End: 2022-01-18

## 2021-01-05 DIAGNOSIS — E28.2 PCOS (POLYCYSTIC OVARIAN SYNDROME): ICD-10-CM

## 2021-01-05 RX ORDER — ESTRADIOL 1 MG/1
TABLET ORAL
Qty: 90 TABLET | Refills: 1 | Status: SHIPPED | OUTPATIENT
Start: 2021-01-05 | End: 2021-08-11

## 2021-01-05 NOTE — TELEPHONE ENCOUNTER
Received refill request for estradiol 1mg tablets for breakthrough bleeding. Ok to send per Walt. rx sent.

## 2021-01-15 ENCOUNTER — HEALTH MAINTENANCE LETTER (OUTPATIENT)
Age: 26
End: 2021-01-15

## 2021-01-20 ENCOUNTER — TELEPHONE (OUTPATIENT)
Dept: FAMILY MEDICINE | Facility: CLINIC | Age: 26
End: 2021-01-20

## 2021-01-20 NOTE — TELEPHONE ENCOUNTER
RN attempted to reach patient-LM to contact clinic. Please transfer to any available RN when she calls back.       RN spoke to Yaneli at [facility] in [employer] who said they do have refills on file for patient's drospirenone-ethinyl estradiol and are working on filling now for patient. Of note- the medication on file was ordered by Dr. Godoy at [facility] so patient should be contacting them with questions or concerns.   Mraie Chin, RN

## 2021-01-20 NOTE — TELEPHONE ENCOUNTER
RN spoke to patient and relayed message below about medication. Patient verbalized understanding and had no further questions at this time.   Marie Chin RN

## 2021-01-20 NOTE — TELEPHONE ENCOUNTER
[facility] phone call message- medication clarification/question:    Full Medication Name: drospirenone-ethinyl estradiol (AMARIS) 3-0.03 MG tablet    Dose: One po daily in continuous fashion. No placebo pills. No days off.    Question/Clarification needed: Patient states on MyChart she can see that she has refills left, but the pharmacy states she does not. She would like to resend the medication or request someone call the pharmacy.      Pharmacy confirmed as       [id number] - [place], [place] - [address] [place] [address]  Phone: [phone] Fax: [phone]  : Yes    Please leave ONLY preferred pharmacy    OK to leave a message on voice mail? Yes    Advised patient that RN would call back within 3 hours, unless emergent.    Primary language: English      needed? No    Call taken on January 20, 2021 at 1:37 PM by April Bivens    Route to [facility]

## 2021-04-16 ENCOUNTER — IMMUNIZATION (OUTPATIENT)
Dept: NURSING | Facility: CLINIC | Age: 26
End: 2021-04-16
Payer: COMMERCIAL

## 2021-04-16 PROCEDURE — 0001A PR COVID VAC PFIZER DIL RECON 30 MCG/0.3 ML IM: CPT

## 2021-04-16 PROCEDURE — 91300 PR COVID VAC PFIZER DIL RECON 30 MCG/0.3 ML IM: CPT

## 2021-05-07 ENCOUNTER — IMMUNIZATION (OUTPATIENT)
Dept: NURSING | Facility: CLINIC | Age: 26
End: 2021-05-07
Attending: INTERNAL MEDICINE
Payer: COMMERCIAL

## 2021-05-07 PROCEDURE — 0002A PR COVID VAC PFIZER DIL RECON 30 MCG/0.3 ML IM: CPT

## 2021-05-07 PROCEDURE — 91300 PR COVID VAC PFIZER DIL RECON 30 MCG/0.3 ML IM: CPT

## 2021-05-09 ENCOUNTER — HEALTH MAINTENANCE LETTER (OUTPATIENT)
Age: 26
End: 2021-05-09

## 2021-06-18 ENCOUNTER — OFFICE VISIT (OUTPATIENT)
Dept: FAMILY MEDICINE | Facility: CLINIC | Age: 26
End: 2021-06-18
Payer: COMMERCIAL

## 2021-06-18 VITALS
WEIGHT: 208 LBS | HEART RATE: 94 BPM | TEMPERATURE: 98.4 F | OXYGEN SATURATION: 97 % | BODY MASS INDEX: 36.85 KG/M2 | RESPIRATION RATE: 16 BRPM | SYSTOLIC BLOOD PRESSURE: 130 MMHG | DIASTOLIC BLOOD PRESSURE: 86 MMHG

## 2021-06-18 DIAGNOSIS — E66.812 CLASS 2 OBESITY DUE TO EXCESS CALORIES WITHOUT SERIOUS COMORBIDITY WITH BODY MASS INDEX (BMI) OF 36.0 TO 36.9 IN ADULT: ICD-10-CM

## 2021-06-18 DIAGNOSIS — F84.0 AUTISM SPECTRUM DISORDER: ICD-10-CM

## 2021-06-18 DIAGNOSIS — Z12.4 SCREENING FOR CERVICAL CANCER: ICD-10-CM

## 2021-06-18 DIAGNOSIS — E28.2 PCOS (POLYCYSTIC OVARIAN SYNDROME): ICD-10-CM

## 2021-06-18 DIAGNOSIS — D68.00 VON WILLEBRAND'S DISEASE (H): ICD-10-CM

## 2021-06-18 DIAGNOSIS — Z00.00 ROUTINE GENERAL MEDICAL EXAMINATION AT A HEALTH CARE FACILITY: Primary | ICD-10-CM

## 2021-06-18 DIAGNOSIS — E66.09 CLASS 2 OBESITY DUE TO EXCESS CALORIES WITHOUT SERIOUS COMORBIDITY WITH BODY MASS INDEX (BMI) OF 36.0 TO 36.9 IN ADULT: ICD-10-CM

## 2021-06-18 LAB
CHOLEST SERPL-MCNC: 185 MG/DL
HBA1C MFR BLD: 5.7 % (ref 0–5.6)
HCV AB SERPL QL IA: NONREACTIVE
HDLC SERPL-MCNC: 58 MG/DL
HIV 1+2 AB+HIV1 P24 AG SERPL QL IA: NONREACTIVE
LDLC SERPL CALC-MCNC: 67 MG/DL
NONHDLC SERPL-MCNC: 127 MG/DL
TRIGL SERPL-MCNC: 298 MG/DL

## 2021-06-18 PROCEDURE — 86803 HEPATITIS C AB TEST: CPT | Performed by: FAMILY MEDICINE

## 2021-06-18 PROCEDURE — 80061 LIPID PANEL: CPT | Performed by: FAMILY MEDICINE

## 2021-06-18 PROCEDURE — 87389 HIV-1 AG W/HIV-1&-2 AB AG IA: CPT | Performed by: FAMILY MEDICINE

## 2021-06-18 PROCEDURE — 83036 HEMOGLOBIN GLYCOSYLATED A1C: CPT | Performed by: FAMILY MEDICINE

## 2021-06-18 PROCEDURE — 36415 COLL VENOUS BLD VENIPUNCTURE: CPT | Performed by: FAMILY MEDICINE

## 2021-06-18 PROCEDURE — 99395 PREV VISIT EST AGE 18-39: CPT | Performed by: FAMILY MEDICINE

## 2021-06-18 PROCEDURE — 99214 OFFICE O/P EST MOD 30 MIN: CPT | Mod: 25 | Performed by: FAMILY MEDICINE

## 2021-06-18 RX ORDER — METFORMIN HCL 500 MG
TABLET, EXTENDED RELEASE 24 HR ORAL
Qty: 187 TABLET | Refills: 0 | Status: SHIPPED | OUTPATIENT
Start: 2021-06-18 | End: 2022-01-27

## 2021-06-18 SDOH — SOCIAL STABILITY: SOCIAL INSECURITY
WITHIN THE LAST YEAR, HAVE YOU BEEN KICKED, HIT, SLAPPED, OR OTHERWISE PHYSICALLY HURT BY YOUR PARTNER OR EX-PARTNER?: NO

## 2021-06-18 SDOH — HEALTH STABILITY: MENTAL HEALTH
STRESS IS WHEN SOMEONE FEELS TENSE, NERVOUS, ANXIOUS, OR CAN'T SLEEP AT NIGHT BECAUSE THEIR MIND IS TROUBLED. HOW STRESSED ARE YOU?: ONLY A LITTLE

## 2021-06-18 SDOH — SOCIAL STABILITY: SOCIAL INSECURITY: WITHIN THE LAST YEAR, HAVE YOU BEEN AFRAID OF YOUR PARTNER OR EX-PARTNER?: NO

## 2021-06-18 SDOH — SOCIAL STABILITY: SOCIAL INSECURITY
WITHIN THE LAST YEAR, HAVE TO BEEN RAPED OR FORCED TO HAVE ANY KIND OF SEXUAL ACTIVITY BY YOUR PARTNER OR EX-PARTNER?: NO

## 2021-06-18 SDOH — HEALTH STABILITY: PHYSICAL HEALTH: ON AVERAGE, HOW MANY MINUTES DO YOU ENGAGE IN EXERCISE AT THIS LEVEL?: 30 MIN

## 2021-06-18 SDOH — SOCIAL STABILITY: SOCIAL NETWORK: HOW OFTEN DO YOU ATTEND CHURCH OR RELIGIOUS SERVICES?: NOT ASKED

## 2021-06-18 SDOH — SOCIAL STABILITY: SOCIAL NETWORK: ARE YOU MARRIED, WIDOWED, DIVORCED, SEPARATED, NEVER MARRIED, OR LIVING WITH A PARTNER?: NOT ASKED

## 2021-06-18 SDOH — SOCIAL STABILITY: SOCIAL NETWORK
DO YOU BELONG TO ANY CLUBS OR ORGANIZATIONS SUCH AS CHURCH GROUPS UNIONS, FRATERNAL OR ATHLETIC GROUPS, OR SCHOOL GROUPS?: YES

## 2021-06-18 SDOH — SOCIAL STABILITY: SOCIAL NETWORK: IN A TYPICAL WEEK, HOW MANY TIMES DO YOU TALK ON THE PHONE WITH FAMILY, FRIENDS, OR NEIGHBORS?: NOT ASKED

## 2021-06-18 SDOH — SOCIAL STABILITY: SOCIAL NETWORK: HOW OFTEN DO YOU ATTENT MEETINGS OF THE CLUB OR ORGANIZATION YOU BELONG TO?: NOT ASKED

## 2021-06-18 SDOH — HEALTH STABILITY: PHYSICAL HEALTH: ON AVERAGE, HOW MANY DAYS PER WEEK DO YOU ENGAGE IN MODERATE TO STRENUOUS EXERCISE (LIKE A BRISK WALK)?: 5 DAYS

## 2021-06-18 SDOH — SOCIAL STABILITY: SOCIAL INSECURITY: WITHIN THE LAST YEAR, HAVE YOU BEEN HUMILIATED OR EMOTIONALLY ABUSED IN OTHER WAYS BY YOUR PARTNER OR EX-PARTNER?: NO

## 2021-06-18 SDOH — SOCIAL STABILITY: SOCIAL NETWORK: HOW OFTEN DO YOU GET TOGETHER WITH FRIENDS OR RELATIVES?: MORE THAN THREE TIMES A WEEK

## 2021-06-18 NOTE — PROGRESS NOTES
Physical Note          HPI       Social Hx  Patient is currently living at home with family and feels safe and well supported. Patient graduated in 2019. She is currently working in [employer]. Patient completed 9 months of therapy at [facility] which she believes helped her with anger management behavior. Patient also recognized she had anxiety which may have been a factor of her anger. Patient is currently involved in an LGBT group. Patient reports she's had her first sexual experience last year but denies having any sexual relationships currently. Patient reports improvement in her stress levels and believes she has improved at regulating and managing her stress. Sleep insomnia, but compensates her sleep schedule accordingly.    Physical activity and diet  Patient is happy to see she lost a few pounds. 220 lb was her maximum weight. Work has helped her stay more active. Walks a lot at work which is five days per week and on her days off she walks the dog. She plans on returning to dance soon. Always Eating is sporadic due to her work schedule. She states to not eat until noon and returns home from work and  eats until 12:30 AM but doesn't sleep till 2 AM. She eats at least one fruit and one vegetable daily and drinks a lot of water. Patient denies vaping or the use of alcohol.     Obgyn  Patient does not enjoy getting her menstruation cycle and is interested in stopping them. She went on birth control which helped for three months but her cycle returned with excess blood clots. She begins spotting for a week before her cycle. She tends to cramp a lot more after being on birth control and reports inconvenience with work and her lifestyle. She is interested in children in the future but would like to adopt and would not mind removing her ovaries.   Pimples on underside of breast and go away on their own that come every few months and believes this is from the birth control.      MSK  She only gets headaches on her periods.  Patient reports back pain. She is still wearing her orthotics. Standing still and moving too much causes pain. Normally doesn't take any pain killers and isn't that bad.       Concerns today: see above      Patient Active Problem List   Diagnosis     PCOS (polycystic ovarian syndrome)     Von Willebrand's disease (H)     Autism spectrum disorder     Generalized hypermobility of joints     Seasonal allergic rhinitis     Pes planus of both feet     Cervicalgia     Chronic bilateral low back pain without sciatica     Class 2 obesity due to excess calories without serious comorbidity with body mass index (BMI) of 36.0 to 36.9 in adult       Past Medical History:   Diagnosis Date     Dyslexia      Dysphasia      Iron deficiency anemia     not responsive to oral iron, venofer infusinos     Nonallopathic lesion of thoracic region 8/2/2016     PCOS (polycystic ovarian syndrome)      Receptive expressive language disorder         Family History   Problem Relation Age of Onset     Hypertension Mother         birth mom      Kidney Disease Mother         IgA nephropathy     Ovarian Cancer Maternal Aunt 61        widely metastatic at dx     No Known Problems Brother      No Known Problems Brother      Prostate Cancer Maternal Grandfather      Coronary Artery Disease No family hx of      Diabetes No family hx of      Hyperlipidemia No family hx of      Cerebrovascular Disease No family hx of      Breast Cancer No family hx of               Review of Systems:     Review of Systems:    Positive for: cramps and headaches while on menstruation cycle, insomnia, back pain    Negative for: heart, lung or bowel problems,     Sleep:   Do you snore most or the night (as reported by a family member)? No  Do you feel sleepy or extremely tired during most of the day? No             Social History     Social History     Socioeconomic History     Marital status: Single     Spouse name: Not on file     Number of children: Not on file     Years  of education: Not on file     Highest education level: Not on file   Occupational History     Occupation: [employer]      Comment: just finished degree in industrial design   Social Needs     Financial resource strain: Not on file     Food insecurity     Worry: Not on file     Inability: Not on file     Transportation needs     Medical: Not on file     Non-medical: Not on file   Tobacco Use     Smoking status: Never Smoker     Smokeless tobacco: Never Used   Substance and Sexual Activity     Alcohol use: No     Drug use: No     Sexual activity: Not Currently     Partners: Female     Birth control/protection: Pill     Comment: OCP for PCOS, using safer sex practices   Lifestyle     Physical activity     Days per week: 5 days     Minutes per session: 30 min     Stress: Only a little   Relationships     Social connections     Talks on phone: Not on file     Gets together: More than three times a week     Attends Yarsani service: Not on file     Active member of club or organization: Yes     Attends meetings of clubs or organizations: Not on file     Relationship status: Not on file     Intimate partner violence     Fear of current or ex partner: No     Emotionally abused: No     Physically abused: No     Forced sexual activity: No   Other Topics Concern      Service Not Asked     Blood Transfusions Not Asked     Caffeine Concern Not Asked     Occupational Exposure Not Asked     Hobby Hazards Not Asked     Sleep Concern Not Asked     Stress Concern Not Asked     Weight Concern Not Asked     Special Diet Not Asked     Back Care Not Asked     Exercise No     Bike Helmet Yes     Seat Belt Yes     Self-Exams Not Asked     Parent/sibling w/ CABG, MI or angioplasty before 65F 55M? Not Asked   Social History Narrative    Finished college in 2019, back at home with parents. Works at [employer].     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 parents    Has anyone hurt you physically, for example by pushing, hitting, slapping or kicking you or forcing you to have sex? Denies  Do you feel threatened or controlled by a partner, ex-partner or anyone in your life? Denies    Sexual Health     Sexual concerns: No   STI History: Neg  Pregnancy History:   LMP Patient's last menstrual period was 2021 (approximate).   Last Pap Smear Date: No results found for: PAP  Abnormal Pap History: None    Recommended Screening     Cholesterol Level (>46 yo or at risk):  Recommended and patient accepted testing., Pap every 3 years for women 21-29. Recommended and patient accepted testing. and HIV screening:  Recommended and patient accepted testing.             Physical Exam:     Vitals: /86   Pulse 94   Temp 98.4  F (36.9  C) (Oral)   Resp 16   Wt 94.3 kg (208 lb)   LMP 2021 (Approximate)   SpO2 97%   BMI 36.85 kg/m    BMI= Body mass index is 36.85 kg/m .   GENERAL: healthy, alert and no distress  EYES: Eyes grossly normal to inspection, extraocular movements - intact, and PERRL  HENT: ear canals- normal; TMs- normal; Nose- normal; Mouth- no ulcers, no lesions, Tongue is scalloped  NECK: no tenderness, no adenopathy, no asymmetry, no masses, no stiffness; thyroid- normal to palpation  RESP: lungs clear to auscultation - no rales, no rhonchi, no wheezes  CV: regular rates and rhythm, normal S1 S2, no S3 or S4 and no murmur, no click or rub -  ABDOMEN: soft, no tenderness, no  hepatosplenomegaly, no masses, normal bowel sounds  MS: extremities- no gross deformities noted, no edema  SKIN: no suspicious lesions, no rashes  NEURO: strength and tone- normal, sensory exam- grossly normal, mentation- intact, speech- normal, reflexes- symmetric  BACK: no CVA tenderness, no paralumbar tenderness  - female: cervix- normal,  adnexae- normal; uterus- normal, no masses, no discharge, normal external genitalia, unable to tolerate a speculum, single finger exam poorly tolerated with tight hymenal ring, unable to pass a cyto brush, blood present, q-tip test is negative.   PSYCH: Alert and oriented times 3; speech- coherent , normal rate and volume; able to articulate logical thoughts, able to abstract reason, no tangential thoughts, no hallucinations or delusions, affect- normal  LYMPHATICS: ant. cervical- normal, post. cervical- normal, axillary- normal, supraclavicular- normal, inguinal- normal      Assessment and Plan      Tami was seen today for physical.    Diagnoses and all orders for this visit:    Routine general medical examination at a health care facility  See MIMI    Autism spectrum disorder  Noted - enc follow up with Brian     PCOS (polycystic ovarian syndrome)  -     metFORMIN (GLUCOPHAGE-XR) 500 MG 24 hr tablet; Take 1 tablet (500 mg) by mouth daily (with dinner) for 7 days, THEN 2 tablets (1,000 mg) daily (with dinner).  1. I would recommend looking into metformin which is a medication that may be worth trying to help regulate your menstruation cycle.    -Side effects include nausea, which improves with time.   -We would start at a low dose, 500 mg, and slowly increase it.    - If this medication is ineffective, you can then look into surgical options.     Von Willebrand's disease (H)  Noted - contributes to PCOS pain with periods .    Class 2 obesity due to excess calories without serious comorbidity with body mass index (BMI) of 36.0 to 36.9 in adult  -     Lipid panel  -     Hemoglobin A1c    Diet  1. Try to combine up protein with the carbs.    - For example, try to add peanut butter with the crackers.    2. I would recommend having something for breakfast even if it's just a couple of bites.   Continue excellent exercising     Screening for cervical cancer  -     Cancel: Pap imaged thin layer screen reflex to HPV if  ASCUS - recommended age 25 - 29 years  Unable - low risk. Enc self exploration, digitally or with toys, not to cause pain but to soften the edges of hymenal ring and eventually hopefully we can do screening   Consider HPV only once operationalized at clinic             Options for treatment and follow-up care were reviewed with the patient . Tami Diaz and/or guardian engaged in the decision making process and verbalized understanding of the options discussed and agreed with the final plan.    Shilpa Rey MD

## 2021-06-18 NOTE — PROGRESS NOTES
Wt Readings from Last 10 Encounters:   06/18/21 94.3 kg (208 lb)   02/11/20 91.7 kg (202 lb 3.2 oz)   10/09/19 95.4 kg (210 lb 4.8 oz)   01/10/19 99.8 kg (220 lb)   03/12/18 92.1 kg (203 lb)   06/22/16 96.6 kg (213 lb)   06/09/16 96.6 kg (213 lb)   06/07/16 96.9 kg (213 lb 9.6 oz)   03/16/16 97.1 kg (214 lb)   03/17/15 99 kg (218 lb 3.2 oz) (99 %, Z= 2.18)*     * Growth percentiles are based on CDC (Girls, 2-20 Years) data.    Body mass index is 36.85 kg/m .

## 2021-06-18 NOTE — PATIENT INSTRUCTIONS
OBGYN  1. I would recommend looking into metformin which is a medication that may be worth trying to help regulate your menstruation cycle.    -Side effects include nausea, which improves with time.   -We would start at a low dose, 500 mg, and slowly increase it.    - If this medication is ineffective, you can then look into surgical options.     Diet  1. Try to combine up protein with the carbs.    - For example, try to add peanut butter with the crackers.    2. I would recommend having something for breakfast even if it's just a couple of bites.     Preventive Health Recommendations  Female Ages 21 to 25     Yearly exam:     See your health care provider every year in order to  o Review health changes.   o Discuss preventive care.    o Review your medicines if your doctor has prescribed any.      You should be tested each year for STDs (sexually transmitted diseases).       Talk to your provider about how often you should have cholesterol testing.      Get a Pap test every three years. If you have an abnormal result, your doctor may have you test more often.      If you are at risk for diabetes, you should have a diabetes test (fasting glucose).     Shots:     Get a flu shot each year.     Get a tetanus shot every 10 years.     Consider getting the shot (vaccine) that prevents cervical cancer (Gardasil).    Nutrition:     Eat at least 5 servings of fruits and vegetables each day.    Eat whole-grain bread, whole-wheat pasta and brown rice instead of white grains and rice.    Get adequate Calcium and Vitamin D.     Lifestyle    Exercise at least 150 minutes a week each week (30 minutes a day, 5 days a week). This will help you control your weight and prevent disease.    Limit alcohol to one drink per day.    No smoking.     Wear sunscreen to prevent skin cancer.    See your dentist every six months for an exam and cleaning.  Preventive Health Recommendations  Female Ages 21 to 25     Yearly exam:   See your health  care provider every year in order to  Review health changes.   Discuss preventive care.    Review your medicines if your doctor has prescribed any.    You should be tested each year for STDs (sexually transmitted diseases).     Talk to your provider about how often you should have cholesterol testing.    Get a Pap test every three years. If you have an abnormal result, your doctor may have you test more often.    If you are at risk for diabetes, you should have a diabetes test (fasting glucose).     Shots:   Get a flu shot each year.   Get a tetanus shot every 10 years.   Consider getting the shot (vaccine) that prevents cervical cancer (Gardasil).    Nutrition:   Eat at least 5 servings of fruits and vegetables each day.  Eat whole-grain bread, whole-wheat pasta and brown rice instead of white grains and rice.  Get adequate Calcium and Vitamin D.     Lifestyle  Exercise at least 150 minutes a week each week (30 minutes a day, 5 days a week). This will help you control your weight and prevent disease.  Limit alcohol to one drink per day.  No smoking.   Wear sunscreen to prevent skin cancer.  See your dentist every six months for an exam and cleaning.

## 2021-06-24 ENCOUNTER — OFFICE VISIT (OUTPATIENT)
Dept: HEMATOLOGY | Facility: CLINIC | Age: 26
End: 2021-06-24
Attending: PHYSICIAN ASSISTANT
Payer: COMMERCIAL

## 2021-06-24 VITALS
HEIGHT: 63 IN | HEART RATE: 76 BPM | WEIGHT: 209.9 LBS | TEMPERATURE: 97.9 F | DIASTOLIC BLOOD PRESSURE: 92 MMHG | OXYGEN SATURATION: 98 % | SYSTOLIC BLOOD PRESSURE: 134 MMHG | BODY MASS INDEX: 37.19 KG/M2 | RESPIRATION RATE: 14 BRPM

## 2021-06-24 DIAGNOSIS — D68.00 VON WILLEBRAND'S DISEASE (H): Primary | ICD-10-CM

## 2021-06-24 LAB
ERYTHROCYTE [DISTWIDTH] IN BLOOD BY AUTOMATED COUNT: 13.1 % (ref 10–15)
HCT VFR BLD AUTO: 44 % (ref 35–47)
HGB BLD-MCNC: 14.3 G/DL (ref 11.7–15.7)
MCH RBC QN AUTO: 26.5 PG (ref 26.5–33)
MCHC RBC AUTO-ENTMCNC: 32.5 G/DL (ref 31.5–36.5)
MCV RBC AUTO: 82 FL (ref 78–100)
PLATELET # BLD AUTO: 272 10E9/L (ref 150–450)
RBC # BLD AUTO: 5.4 10E12/L (ref 3.8–5.2)
WBC # BLD AUTO: 5.1 10E9/L (ref 4–11)

## 2021-06-24 PROCEDURE — 36415 COLL VENOUS BLD VENIPUNCTURE: CPT | Performed by: PHYSICIAN ASSISTANT

## 2021-06-24 PROCEDURE — 999N001023 HC STATISTIC INR NC: Performed by: PHYSICIAN ASSISTANT

## 2021-06-24 PROCEDURE — 999N001028 HC STATISTIC PTT NC: Performed by: PHYSICIAN ASSISTANT

## 2021-06-24 PROCEDURE — 85390 FIBRINOLYSINS SCREEN I&R: CPT | Performed by: PATHOLOGY

## 2021-06-24 PROCEDURE — 99207 PR CDG-CODE CATEGORY CHANGED: CPT | Performed by: PHYSICIAN ASSISTANT

## 2021-06-24 PROCEDURE — G0463 HOSPITAL OUTPT CLINIC VISIT: HCPCS

## 2021-06-24 PROCEDURE — 85027 COMPLETE CBC AUTOMATED: CPT | Performed by: PHYSICIAN ASSISTANT

## 2021-06-24 PROCEDURE — 85245 CLOT FACTOR VIII VW RISTOCTN: CPT | Performed by: PHYSICIAN ASSISTANT

## 2021-06-24 PROCEDURE — 85246 CLOT FACTOR VIII VW ANTIGEN: CPT | Performed by: PHYSICIAN ASSISTANT

## 2021-06-24 PROCEDURE — 85240 CLOT FACTOR VIII AHG 1 STAGE: CPT | Performed by: PHYSICIAN ASSISTANT

## 2021-06-24 PROCEDURE — 999N001035 HC STATISTIC THROMBIN TIME NC: Performed by: PHYSICIAN ASSISTANT

## 2021-06-24 PROCEDURE — 99214 OFFICE O/P EST MOD 30 MIN: CPT | Performed by: PHYSICIAN ASSISTANT

## 2021-06-24 RX ORDER — AMINOCAPROIC ACID 0.25 G/ML
3 SYRUP ORAL 3 TIMES DAILY
Qty: 236.5 ML | Refills: 1 | Status: SHIPPED | OUTPATIENT
Start: 2021-06-24 | End: 2022-01-27

## 2021-06-24 ASSESSMENT — PAIN SCALES - GENERAL: PAINLEVEL: NO PAIN (0)

## 2021-06-24 ASSESSMENT — MIFFLIN-ST. JEOR: SCORE: 1666.23

## 2021-06-24 NOTE — PROGRESS NOTES
[facility]  [facility]  [address], [address], [place] [address]  Main: [phone], Fax: [phone]        6/24/2021  Outpatient Clinic Visit    Tami is a 26-year-old woman here today to re-establish care in the [facility].  [facility] [place].      She was initially evaluated as a young child due to easy bruising and epistaxis.  She later developed significant menorrhagia.  She was felt to probably have type 1 von Willebrand disease.  She has 2 brothers, both of whom have more definitive diagnoses of von Willebrand disease.  Her bleeding issues have been heavy menstrual bleeding and some epistaxis.  She reports the nosebleeding as frequent but generally short lived.  She has not been doing any preventative treatment for nosebleeds recently.   She is diagnosed with PCOS and is on hormone therapy for this.  She says despite this she has irregular and somewhat heavy periods.      She has never had a surgical hemostatic challenge.  She is again here today simply to re-establish care and make a plan for upcoming wisdom teeth extraction. She needs to have two upper impacted wisdom teeth removed.  Planning to use Dr. Patel oral surgeon ([phone])      HISTORY OF BLEEDING DISORDER  We reviewed the results of her previous testing performed at [place] [facility] by Dr. Caron Retana and also some testing that was performed here at the [facility] subsequent to that.  She was initially tested in 08/2001.  At that time, she saw Dr. Duane Hasegawa in [place].  Lab values from that evaluation were remarkable for a normal factor VIII of 109% and a normal von Willebrand antigen of 73%.  However, her ristocetin cofactor activity was significantly reduced at 25%.  Her von Willebrand factor multimer analysis was said to be consistent with type 1 von  Willebrand disease.  Repeat testing in 11/2001 showed a factor VIII of 91%, a von Willebrand antigen of 180%, and this time her ristocetin cofactor activity was normal at 103%.  However, at that time, her platelet function testing with the PFA-100 instrument was clearly abnormal.  She was tested here at the [facility] in 10/2002.  At that time, she had a full bleeding workup.  Her CBC was normal.  Bleeding time was prolonged at 16 minutes and greater than 20 minutes on repeat testing.  Factor VIII level was 162%, von Willebrand antigen was 133%, and ristocetin cofactor activity was 108%.  Additional labs include a normal factor IX level, normal factor XIII, normal fibrinogen antigen and activity levels, normal SHEMAR-1 and normal alpha-2 antiplasmin levels.  At that time, her platelet function testing with the PFA-100 instrument was normal.  She also had formal platelet aggregation studies which were normal.  Platelet morphology with light microscopy was normal as well.  Testing was agin performed in 03/2012 prior to starting estrogen therapy.  At that time, her factor VIII level was 126%, von Willebrand antigen 76%, and ristocetin cofactor activity was again low at 50%.  Thus, we have 4 sets of numbers and all of them show normal factor VIII and von Willebrand antigen levels, but 2 of them show ristocetin cofactor activities that are slightly low to frankly low and 2 show normal ristocetin cofactor activity.  She has had abnormal platelet function testing with the PFA-100 instrument twice (11/2011 and 03/2012) but normal platelet aggregation testing in 2002.      Her von Willebrand numbers are clearly distinct from that of her 2 brothers, both of whom have factor VIII, von Willebrand antigen and ristocetin cofactor activity levels in the 35% to 60% range.      Her mother was also evaluated here back in 2003.  She was tested again in 2006.  On both occasions, her von Willebrand panel was clearly normal  with factor VIII, von Willebrand antigen and ristocetin cofactor activity levels in the 100% to 130% range.  She did have platelet function testing performed with the PFA-100 instrument and had a pattern similar to aspirin effect, although she was not taking non-steroidal anti-inflammatory drugs at that time.  Her mom's history consists primarily of menorrhagia but also some epistaxis and easy bruising, although overall it sounds like her bleeding has been less problematic than what Tami has experienced.  Her father has no known bleeding history.       Tami does have some features of hypermobility.  She is able to touch her tongue to the tip of her nose.  She is able to hyperextend her elbows slightly; however, she does not have the same degree of hypermobility of the small joints in her fingers and thumbs.  Her skin appears to be of normal texture and elasticity.  She does have small scars from her bleeding times which are perhaps a bit wider than normal, although not clearly abnormal.  She also has a small scar on her right leg where she received 9 stitches during childhood.  The scar is slightly wide but not clearly abnormal.      Labs:   Results of bleeding work-up done in 2015 were all normal, including von Willebrand panel, platelet aggregation studies, an platelet electron microscopy.  Thus, although her clinical history is suggestive of a defect in primary hemostasis, we cannot establish such a diagnosis on the basis of the current lab results.     Normal platelet aggregation testing 4/2015     Ref. Range 4/28/2015 08:20   Factor 8 Assay Latest Ref Range: 60 - 140 % 125   von Willebrand Antigen Latest Ref Range: 50 - 160 % 86   Ristocetin Cofactor Latest Ref Range: 50 - 180 % 92     ASSESSMENT AND PLAN:   1.  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 Platelet aggregation testing and von Willebrand testing done in 2015 was normal.   Von Willebrand testing will be repeated today - last estrogen was one week ago.   2.  Family history of von Willebrand disease in her 2 brothers.     Jeremy has very mild bleeding symptoms and von Willebrand levels done in 2015 were in the normal range.  Thus we feel she can proceed with planned oral surgery with use of antifibrinolytic (Amicar) post-procedure.  Rx was given her today.     Discussed options for menstrual control and encouraged her to follow-up with GYN clinic.  Appears to be a combination of PCOS issues and possible primary hemostasis issue.      PLAN FOR ORAL SURGERY:  Amicar 3 g po q 6-8 hours for minimum of 3 days post procedure.  Can start dosing before procedure.    Return to clinic every 1-2 years or as needed for bleeding symptoms.      Marissa Hansen MPH, PA-C  [facility]  [facility]  [phone] main line  [phone] pager  [phone] fax    30 minutes was spent in patient care today related to bleeding disorder    Results for LUCAS HANCOCK (MRN [id number]) as of 6/29/2021 15:50   Ref. Range 6/24/2021 11:56   Factor 8 Assay Latest Ref Range: 55 - 200 % 121   von Willebrand Antigen Latest Ref Range: 50 - 200 % 86   von Willebrand Factor Activity Latest Ref Range: 50 - 180 % 69     6/29/2021  Von Willebrand levels were normal off estrogen, plan for dental extraction is as above.    Will send message by [employer] to patient.

## 2021-06-29 LAB
FACT VIII ACT/NOR PPP: 121 % (ref 55–200)
VWF CBA/VWF AG PPP IA-RTO: 86 % (ref 50–200)
VWF:AC ACT/NOR PPP IA: 69 % (ref 50–180)

## 2021-07-01 LAB — VON WILLEBRAND INTERPRETATION: NORMAL

## 2021-08-09 DIAGNOSIS — E28.2 PCOS (POLYCYSTIC OVARIAN SYNDROME): ICD-10-CM

## 2021-08-09 NOTE — TELEPHONE ENCOUNTER
Refill request received for estradiol. Last in clinic 10/2019. [employer] message sent instructing patient to schedule annual exam. Refill routed to Dr. Godoy to review.

## 2021-08-11 RX ORDER — ESTRADIOL 1 MG/1
TABLET ORAL
Qty: 90 TABLET | Refills: 1 | Status: SHIPPED | OUTPATIENT
Start: 2021-08-11 | End: 2024-01-05

## 2021-10-24 ENCOUNTER — HEALTH MAINTENANCE LETTER (OUTPATIENT)
Age: 26
End: 2021-10-24

## 2022-01-18 DIAGNOSIS — E28.2 PCOS (POLYCYSTIC OVARIAN SYNDROME): ICD-10-CM

## 2022-01-18 RX ORDER — DROSPIRENONE AND ETHINYL ESTRADIOL 0.03MG-3MG
KIT ORAL
Qty: 112 TABLET | Refills: 3 | Status: SHIPPED | OUTPATIENT
Start: 2022-01-18 | End: 2024-01-05

## 2022-01-18 NOTE — TELEPHONE ENCOUNTER
Received refill request for OCP. Patient seen in 2019. Refill sent. Apterahart reminder to schedule for annual. It appears Pap is due.

## 2022-01-27 ENCOUNTER — OFFICE VISIT (OUTPATIENT)
Dept: FAMILY MEDICINE | Facility: CLINIC | Age: 27
End: 2022-01-27
Payer: COMMERCIAL

## 2022-01-27 VITALS
DIASTOLIC BLOOD PRESSURE: 68 MMHG | TEMPERATURE: 98.4 F | RESPIRATION RATE: 18 BRPM | HEART RATE: 78 BPM | SYSTOLIC BLOOD PRESSURE: 118 MMHG | BODY MASS INDEX: 37.35 KG/M2 | OXYGEN SATURATION: 97 % | WEIGHT: 210.8 LBS | HEIGHT: 63 IN

## 2022-01-27 DIAGNOSIS — Z23 NEED FOR PROPHYLACTIC VACCINATION AND INOCULATION AGAINST INFLUENZA: ICD-10-CM

## 2022-01-27 DIAGNOSIS — H61.23 EXCESSIVE EAR WAX, BILATERAL: Primary | ICD-10-CM

## 2022-01-27 PROCEDURE — 99214 OFFICE O/P EST MOD 30 MIN: CPT | Mod: 25 | Performed by: STUDENT IN AN ORGANIZED HEALTH CARE EDUCATION/TRAINING PROGRAM

## 2022-01-27 PROCEDURE — 90471 IMMUNIZATION ADMIN: CPT | Performed by: STUDENT IN AN ORGANIZED HEALTH CARE EDUCATION/TRAINING PROGRAM

## 2022-01-27 PROCEDURE — 90686 IIV4 VACC NO PRSV 0.5 ML IM: CPT | Performed by: STUDENT IN AN ORGANIZED HEALTH CARE EDUCATION/TRAINING PROGRAM

## 2022-01-27 ASSESSMENT — MIFFLIN-ST. JEOR: SCORE: 1665.31

## 2022-01-27 NOTE — PATIENT INSTRUCTIONS
Thanks for coming in today, I am sorry I was not able to get the earwax out completely with just the curette.  I hope the discomfort passes swiftly.  You can use mineral oil eardrops or Ciprodex eardrops to help prevent earwax from building up in the ears.  I agree with it being a good choice to not use Q-tips in the ears that can cause trauma into the ear canal.    Looking at your chart review since he cannot tolerate the Metformin it would be worth checking in with Dr. Rey in about 6 months.  Let us know if you have questions or concerns in the meantime.

## 2022-01-27 NOTE — PROGRESS NOTES
"  Assessment & Plan     Need for prophylactic vaccination and inoculation against influenza  Patient due for influenza shot, will accept today.  This was given.  - INFLUENZA VACCINE IM > 6 MONTHS VALENT IIV4 (AFLURIA/FLUZONE)    Excessive ear wax, bilateral  Ear wax noted, able to remove from right ear to patient satisfaction with curette, unable to fully remove from left ear after 3 attempts even though significant wax was removed in the process.  Left ear was washed out at patient's request as the sensation of fullness from earwax was worse than the temporary discomfort of having the ear washed out.  We discussed use of mineral drops as maintenance to prevent buildup of wax and not just when there is significant wax buildup causing symptoms.  Patient will consider.    Social determinants of health include autism diagnosis mental health, member of the LGBT community.  Both for patient long-term statistical risks for health burden.    Follow-up as needed with primary care doctor for yearly appointment  No follow-ups on file.    Iris Trevino MD  [facility] GILBERT Garrett is a 27 year old who presents for the following health issues     HPI     Patient with history of significant earwax buildup, does not use Q-tips at home.  Has autism and difficulty cleaning out her own ears at home.  Would like to try having a curette used to scrape out the earwax here in clinic, had significant pain after washing out the ears last time and would like to avoid washing out the ears if at all possible.    Review of Systems   See HPI      Objective    /68 (BP Location: Left arm, Patient Position: Sitting, Cuff Size: Adult Large)   Pulse 78   Temp 98.4  F (36.9  C) (Oral)   Resp 18   Ht 1.6 m (5' 3\")   Wt 95.6 kg (210 lb 12.8 oz)   LMP 01/06/2022 (LMP Unknown)   SpO2 97%   Breastfeeding No   BMI 37.34 kg/m    Body mass index is 37.34 kg/m .  Physical Exam   Gen: Alert and " appropriate human who appears stated age in no acute distress  HEENT: Occlusive wax noted in left ear canal, significant wax but not occlusive noted in right ear canal.  Ear nontender to manipulation, no bleeding or erythema.

## 2022-01-27 NOTE — PROGRESS NOTES
Preceptor Attestation:   Patient seen, evaluated and discussed with the resident. I have verified the content of the note, which accurately reflects my assessment of the patient and the plan of care.   Supervising Physician:  Hussain Issa MD

## 2022-02-01 NOTE — PROGRESS NOTES
"       HPI       Tami Diaz is a 25 year old  who presents for   Chief Complaint   Patient presents with     Ear Problem     Since a week ago Sunday the right ear has felt clogged and has been hurting         Concern: Ear problem   Description of the problem : Over a week. Right ear. Pressure. No drainage. Rare sharp pain, some dull pain. NO recent swimming or hot tubs. No fevers or chills.       +++++++    Problem, Medication and Allergy Lists were reviewed and updated if needed..    Patient is an established patient of this clinic..         Review of Systems:   Review of Systems   Constitutional: Negative for chills and fever.   HENT: Positive for congestion and ear pain. Negative for ear discharge and sore throat.    Eyes: Negative for visual disturbance.   Respiratory: Negative for cough.    Cardiovascular: Negative for chest pain.            Physical Exam:     Vitals:    02/11/20 0925   BP: 135/81   BP Location: Right arm   Patient Position: Sitting   Cuff Size: Adult Regular   Pulse: 67   Resp: 16   Temp: 97.5  F (36.4  C)   TempSrc: Oral   SpO2: 98%   Weight: 91.7 kg (202 lb 3.2 oz)   Height: 1.6 m (5' 3\")     Body mass index is 35.82 kg/m .  Vitals were reviewed and were normal     Physical Exam  Constitutional:       General: She is not in acute distress.     Appearance: She is well-developed.   HENT:      Head: Normocephalic and atraumatic.      Right Ear: A foreign body is present.      Left Ear: A foreign body is present. Tympanic membrane is bulging.      Ears:      Comments: Cerumen blocking canals bilaterally, removed by irrigation with water and left clear on recheck and right with bulging TM without erythema or purulence   Eyes:      General: No scleral icterus.     Extraocular Movements: Extraocular movements intact.   Cardiovascular:      Rate and Rhythm: Normal rate.      Heart sounds: Normal heart sounds.   Pulmonary:      Effort: Pulmonary effort is normal. No respiratory distress. " well developed, well nourished , in no acute distress , ambulating without difficulty , normal communication ability , well developed, well nourished , in no acute distress , ambulating without difficulty , normal communication ability   Neurological:      General: No focal deficit present.      Mental Status: She is alert and oriented to person, place, and time.   Psychiatric:         Mood and Affect: Mood normal.         Behavior: Behavior normal.         Thought Content: Thought content normal.         Results:   No testing ordered today    Assessment and Plan        Tami was seen today for ear problem.    Diagnoses and all orders for this visit:    Right ear pain  Bilateral impacted cerumen  Cerumen in bilateral canals removed with irrigation water by MA. Clear after. No signs of AOM. Though, Given that this has been interfering with the patient's quality of life and ADLs, after discussion, informed consent, and medical assessment for safety, we have together decided to address this concern with Osteopathic Manipulative Treatment.    Somatic dysfunction of head region  -     OSTEOPATHIC MANIP,1-2 BODY REGN  Please see OMT Procedure Note below for the specifics of treatment.    Follow up as able for CPE with PAP.     Medications Discontinued During This Encounter   Medication Reason     metFORMIN (GLUCOPHAGE-XR) 500 MG 24 hr tablet        Options for treatment and follow-up care were reviewed with the patient. Tami Diaz  engaged in the decision making process and verbalized understanding of the options discussed and agreed with the final plan.    Lev Samayoa, DO       OMT PROCEDURE NOTE    Body Region: Head, Face, and/or Jaw  Somatic Dysfunction: ET right dysfunction   Treatment: Lymphatic and (with Thoracic Duct opening and closing) Techniques.  Outcome: Improved    The patient actively participated in OMT and was able to communicate both positive and negative feedback throughout. OMT completed without incident. Patient tolerated treatment well. Patient reported that ROM, function, and/or pain level were improved. Advised that pain is occasionally worse during the first 24 hours after treatment and that drinking more water and taking  Tylenol or Ibuprofen often help. Patient to return in 2 week/s or as needed for repeat osteopathic evaluation.     Lev Samayoa DO, MA  Family Medicine PGY-3  [facility], [facility]

## 2022-07-31 ENCOUNTER — HEALTH MAINTENANCE LETTER (OUTPATIENT)
Age: 27
End: 2022-07-31

## 2022-10-15 ENCOUNTER — HEALTH MAINTENANCE LETTER (OUTPATIENT)
Age: 27
End: 2022-10-15

## 2023-08-20 ENCOUNTER — HEALTH MAINTENANCE LETTER (OUTPATIENT)
Age: 28
End: 2023-08-20

## 2024-01-05 ENCOUNTER — OFFICE VISIT (OUTPATIENT)
Dept: FAMILY MEDICINE | Facility: CLINIC | Age: 29
End: 2024-01-05
Payer: COMMERCIAL

## 2024-01-05 VITALS
HEIGHT: 63 IN | TEMPERATURE: 98 F | BODY MASS INDEX: 38.8 KG/M2 | WEIGHT: 219 LBS | SYSTOLIC BLOOD PRESSURE: 124 MMHG | RESPIRATION RATE: 18 BRPM | OXYGEN SATURATION: 98 % | HEART RATE: 76 BPM | DIASTOLIC BLOOD PRESSURE: 81 MMHG

## 2024-01-05 DIAGNOSIS — Z71.89 ACP (ADVANCE CARE PLANNING): ICD-10-CM

## 2024-01-05 DIAGNOSIS — E28.2 PCOS (POLYCYSTIC OVARIAN SYNDROME): ICD-10-CM

## 2024-01-05 DIAGNOSIS — N94.2 VAGINISMUS: ICD-10-CM

## 2024-01-05 DIAGNOSIS — Z00.00 ROUTINE GENERAL MEDICAL EXAMINATION AT A HEALTH CARE FACILITY: Primary | ICD-10-CM

## 2024-01-05 DIAGNOSIS — F52.6 SEXUAL PAIN DISORDER: ICD-10-CM

## 2024-01-05 DIAGNOSIS — D68.00 VON WILLEBRAND'S DISEASE (H): ICD-10-CM

## 2024-01-05 DIAGNOSIS — E66.812 CLASS 2 OBESITY DUE TO EXCESS CALORIES WITHOUT SERIOUS COMORBIDITY WITH BODY MASS INDEX (BMI) OF 36.0 TO 36.9 IN ADULT: ICD-10-CM

## 2024-01-05 DIAGNOSIS — E66.09 CLASS 2 OBESITY DUE TO EXCESS CALORIES WITHOUT SERIOUS COMORBIDITY WITH BODY MASS INDEX (BMI) OF 36.0 TO 36.9 IN ADULT: ICD-10-CM

## 2024-01-05 DIAGNOSIS — M25.531 RIGHT WRIST PAIN: ICD-10-CM

## 2024-01-05 PROCEDURE — 99395 PREV VISIT EST AGE 18-39: CPT | Performed by: FAMILY MEDICINE

## 2024-01-05 PROCEDURE — 99213 OFFICE O/P EST LOW 20 MIN: CPT | Mod: 25 | Performed by: FAMILY MEDICINE

## 2024-01-05 ASSESSMENT — ENCOUNTER SYMPTOMS
FREQUENCY: 0
DIZZINESS: 0
BREAST MASS: 0
CONSTIPATION: 0
ABDOMINAL PAIN: 0
HEADACHES: 0
SORE THROAT: 0
HEMATOCHEZIA: 0
HEMATURIA: 0
CHILLS: 0
SHORTNESS OF BREATH: 0
EYE PAIN: 0
PALPITATIONS: 0
NAUSEA: 0
PARESTHESIAS: 0
FEVER: 0
COUGH: 0
MYALGIAS: 0
DYSURIA: 0
ARTHRALGIAS: 1
JOINT SWELLING: 0
WEAKNESS: 0
HEARTBURN: 0
DIARRHEA: 0
NERVOUS/ANXIOUS: 0

## 2024-01-05 NOTE — PROGRESS NOTES
SUBJECTIVE:   Tami is a 28 year old, presenting for the following:  Physical    Healthy Habits:     Getting at least 3 servings of Calcium per day:  Yes    Bi-annual eye exam:  NO    Dental care twice a year:  Yes    Sleep apnea or symptoms of sleep apnea:  None    Diet:  Regular (no restrictions)    Frequency of exercise:  6-7 days/week    Duration of exercise:  15-30 minutes    Taking medications regularly:  Yes    Medication side effects:  Not applicable    Additional concerns today:  No    Tami was recently diagnosed with a COVID infection.     Mental health  She is going to a group therapy session every week.     Menstruation  She reports she is off birth control for 2 years. She was having issues with weight and now she does feel better off of birth control. She notes that she has worse cramps and feeling worse around her period. She has woken up due to cramps. She has also noticed an increase in facial acne. She would like to discuss alternative period control measures.    Sexual function  She notes that she struggles to relax muscles as desired leading to sexual pain. She has previously considered sex therapy but has not started it. She doesn't currently have any concerns for    Diet & exercise  She endorses eating multiple servings of vegetables every day. She does not drink alcohol. She walks her dog every day. She endorses that she drinks plenty of water. She feels that she is overall happy and healthy.    Dental Care  She is getting regular dental care.    Have you ever done Advance Care Planning? (For example, a Health Directive, POLST, or a discussion with a medical provider or your loved ones about your wishes): No, advance care planning information given to patient to review.  Patient plans to discuss their wishes with loved ones or provider.      Social History     Tobacco Use    Smoking status: Never    Smokeless tobacco: Never   Substance Use Topics    Alcohol use: No         Reviewed  orders with patient.  Reviewed health maintenance and updated orders accordingly - Yes    Breast Cancer Screening:  Any new diagnosis of family breast, ovarian, or bowel cancer? No    Pertinent mammograms are reviewed under the imaging tab.    History of abnormal Pap smear: NO - age 21-29 PAP every 3 years recommended     Reviewed and updated as needed this visit by clinical staff   Tobacco  Allergies  Meds  Problems  Med Hx  Surg Hx  Fam Hx  Soc   Hx        Reviewed and updated as needed this visit by Provider   Tobacco  Allergies  Meds  Problems  Med Hx  Surg Hx  Fam Hx  Soc   Hx       Past Medical History:   Diagnosis Date    Dyslexia     Dysphasia     Iron deficiency anemia     not responsive to oral iron, venofer infusinos    Nonallopathic lesion of thoracic region 2016    PCOS (polycystic ovarian syndrome)     Receptive expressive language disorder       Past Surgical History:   Procedure Laterality Date    ZZHC CONTROL NOSEBLEED ANTERIOR, SIMPLE      multiple, due to VWD     OB History    Para Term  AB Living   0 0 0 0 0 0   SAB IAB Ectopic Multiple Live Births   0 0 0 0 0       Review of Systems   Constitutional:  Negative for chills and fever.   HENT:  Positive for congestion. Negative for ear pain, hearing loss and sore throat.    Eyes:  Negative for pain and visual disturbance.   Respiratory:  Negative for cough and shortness of breath.    Cardiovascular:  Negative for chest pain, palpitations and peripheral edema.   Gastrointestinal:  Negative for abdominal pain, constipation, diarrhea, heartburn, hematochezia and nausea.   Breasts:  Negative for tenderness, breast mass and discharge.   Genitourinary:  Negative for dysuria, frequency, genital sores, hematuria, pelvic pain, urgency, vaginal bleeding and vaginal discharge.   Musculoskeletal:  Positive for arthralgias. Negative for joint swelling and myalgias.   Skin:  Negative for rash.   Neurological:  Negative for  "dizziness, weakness, headaches and paresthesias.   Psychiatric/Behavioral:  Negative for mood changes. The patient is not nervous/anxious.      This document serves as a record of the services and decisions personally performed and made by Shilpa Rey MD. It was created on his/her behalf by Sherwin Velasquez, a trained medical scribe. The creation of this document is based the provider's statements to the medical scribe.  Scribe Sherwin Velasquez 3:02 PM, January 5, 2024      OBJECTIVE:   /81   Pulse 76   Temp 98  F (36.7  C) (Oral)   Resp 18   Ht 1.6 m (5' 3\")   Wt 99.3 kg (219 lb)   SpO2 98%   BMI 38.79 kg/m    Physical Exam  GENERAL: healthy, alert and no distress  EYES: Eyes grossly normal to inspection, PERRL and conjunctivae and sclerae normal  HENT: ear canals and TM's normal, nose and mouth without ulcers or lesions  NECK: no adenopathy, no asymmetry, masses, or scars and thyroid normal to palpation  RESP: lungs clear to auscultation - no rales, rhonchi or wheezes  CV: regular rate and rhythm, normal S1 S2, no S3 or S4, no murmur, click or rub, no peripheral edema and peripheral pulses strong  ABDOMEN: soft, nontender, no hepatosplenomegaly, no masses and bowel sounds normal  MS: Tender to palpation over the soft tissue covering the small carpal bones of the radial aspect of the right wrist.  SKIN: no suspicious lesions or rashes  NEURO: Normal tone, mentation intact and speech normal  PSYCH: mentation appears normal, affect normal/bright      ASSESSMENT/PLAN:   (Z00.00) Routine general medical examination at a health care facility  (primary encounter diagnosis)  Comment: See AVS.  Plan: Hemoglobin A1c    (Z71.89) ACP (advance care planning)  Comment: Given ACD for self and partner and reviewed in detail.  Plan:     (E28.2) PCOS (polycystic ovarian syndrome)  Comment: Off OCPs and feels better off of them but is struggling with pain and cramping. Reviewed period control options. Has had an attempted " IUD placement in 2019 with a tight cervical os versus vaginismus and related discomfort. I recommend getting IUD placement with Gyn with both cervical block and oral sedation. Educated that she would need a . Referral placed today.    Not interested in conception at this point, so not looking to regulate with metformin and did not tolerate when previously tried.    If for some reason an IUD was not possible, another option for symptom control would be spironolactone for bothersome hair and acne.  Plan: Ob/Gyn  Referral          (E66.09,  Z68.36) Overweight  Comment: BMI 38. Discussed single risk factor for diabetes. Would add on A1c for future labs with hematology. Continue diet and exercise.   Plan: CANCELED: Lipid panel reflex to direct LDL         Fasting          (N94.2) Vaginismus; (F52.6) Sexual pain disorder  Comment: Significant pain especially with penetration or anticipated penetration by self, object, or other. Has been discussing possibility of sex therapy with partner and a referral was placed today. Experiencing some co-morbid issues around libido due to anticipating pain however arousal and orgasm are intact. Given patient education resources and encouraged going slow, being in the moment, and avoiding painful stimuli.  Plan: Ob/Gyn  Referral, Adult Mental Health          Referral        Adult Mental Health  Referral          (D68.00) Von Willebrand's disease (H)  Comment: Last saw heme in 2021. Likely due for lab. Referred to hematology for labs.  Plan: Adult Oncology/Hematology  Referral    (M25.531) Right wrist pain  Comment: Likely associated with hypermobility of joints and an old injury. Struggling with writing a lot. Referred to hand therapy for possible thermoplastic splint.  Plan: Occupational Therapy Referral    Patient has been advised of split billing requirements and indicates understanding: Yes    COUNSELING:  Reviewed preventive health  counseling, as reflected in patient instructions       Regular exercise       Healthy diet/nutrition       Alcohol Use       Safe sex practices/STD prevention       Advance Care Planning       Vaccinations    She reports that she has never smoked. She has never used smokeless tobacco.        Shilpa Rey MD  Spent 28 minutes addressing chronic concerns in addition to preventive care above.   [facility]

## 2024-01-05 NOTE — PATIENT INSTRUCTIONS
Consider sex therapy. [facility] is a possibility. Referral sent.   Referral placed for GYN and Hematology.  Future labs for A1c.  Get flu and COVID vaccine at a pharmacy when you are feeling better.    Preventive Health Recommendations  Female Ages 26 - 39  Yearly exam:   See your health care provider every year in order to  Review health changes.   Discuss preventive care.    Review your medicines if you your doctor has prescribed any.    Until age 30: Get a Pap test every three years (more often if you have had an abnormal result).    After age 30: Talk to your doctor about whether you should have a Pap test every 3 years or have a Pap test with HPV screening every 5 years.   You do not need a Pap test if your uterus was removed (hysterectomy) and you have not had cancer.  You should be tested each year for STDs (sexually transmitted diseases), if you're at risk.   Talk to your provider about how often to have your cholesterol checked.  If you are at risk for diabetes, you should have a diabetes test (fasting glucose).  Shots: Get a flu shot each year. Get a tetanus shot every 10 years.   Nutrition:   Eat at least 5 servings of fruits and vegetables each day.  Eat whole-grain bread, whole-wheat pasta and brown rice instead of white grains and rice.  Get adequate Calcium and Vitamin D.     Lifestyle  Exercise at least 150 minutes a week (30 minutes a day, 5 days of the week). This will help you control your weight and prevent disease.  Limit alcohol to one drink per day.  No smoking.   Wear sunscreen to prevent skin cancer.  See your dentist every six months for an exam and cleaning.

## 2024-01-09 ENCOUNTER — TELEPHONE (OUTPATIENT)
Dept: HEMATOLOGY | Facility: CLINIC | Age: 29
End: 2024-01-09
Payer: COMMERCIAL

## 2024-01-24 ENCOUNTER — TELEPHONE (OUTPATIENT)
Dept: HEMATOLOGY | Facility: CLINIC | Age: 29
End: 2024-01-24
Payer: COMMERCIAL

## 2024-09-24 ENCOUNTER — PATIENT OUTREACH (OUTPATIENT)
Dept: FAMILY MEDICINE | Facility: CLINIC | Age: 29
End: 2024-09-24
Payer: COMMERCIAL

## 2024-09-24 NOTE — TELEPHONE ENCOUNTER
Patient Quality Outreach    Patient is due for the following:   Cervical Cancer Screening - PAP Needed    Next Steps:   Schedule a office visit for cervical cancer screening Adult Preventative    Type of outreach:    Sent "[employer] message.    Questions for provider review:    None           Alice Burns RN

## 2025-02-22 ENCOUNTER — HEALTH MAINTENANCE LETTER (OUTPATIENT)
Age: 30
End: 2025-02-22